# Patient Record
Sex: FEMALE | Race: WHITE
[De-identification: names, ages, dates, MRNs, and addresses within clinical notes are randomized per-mention and may not be internally consistent; named-entity substitution may affect disease eponyms.]

---

## 2019-06-03 ENCOUNTER — HOSPITAL ENCOUNTER (EMERGENCY)
Dept: HOSPITAL 63 - ER | Age: 49
Discharge: HOME | End: 2019-06-03
Payer: OTHER GOVERNMENT

## 2019-06-03 VITALS — DIASTOLIC BLOOD PRESSURE: 73 MMHG | SYSTOLIC BLOOD PRESSURE: 110 MMHG

## 2019-06-03 DIAGNOSIS — Z90.710: ICD-10-CM

## 2019-06-03 DIAGNOSIS — K57.32: Primary | ICD-10-CM

## 2019-06-03 DIAGNOSIS — F32.9: ICD-10-CM

## 2019-06-03 DIAGNOSIS — Z88.1: ICD-10-CM

## 2019-06-03 LAB
ALBUMIN SERPL-MCNC: 4.1 G/DL (ref 3.4–5)
ALBUMIN/GLOB SERPL: 1.3 {RATIO} (ref 1–1.7)
ALP SERPL-CCNC: 65 U/L (ref 46–116)
ALT SERPL-CCNC: 18 U/L (ref 14–59)
ANION GAP SERPL CALC-SCNC: 9 MMOL/L (ref 6–14)
APTT PPP: YELLOW S
AST SERPL-CCNC: 14 U/L (ref 15–37)
BACTERIA #/AREA URNS HPF: 0 /HPF
BASOPHILS # BLD AUTO: 0.1 X10^3/UL (ref 0–0.2)
BASOPHILS NFR BLD: 1 % (ref 0–3)
BILIRUB SERPL-MCNC: 0.3 MG/DL (ref 0.2–1)
BILIRUB UR QL STRIP: (no result)
BUN/CREAT SERPL: 30 (ref 6–20)
CA-I SERPL ISE-MCNC: 21 MG/DL (ref 7–20)
CALCIUM SERPL-MCNC: 8.9 MG/DL (ref 8.5–10.1)
CHLORIDE SERPL-SCNC: 102 MMOL/L (ref 98–107)
CO2 SERPL-SCNC: 30 MMOL/L (ref 21–32)
CREAT SERPL-MCNC: 0.7 MG/DL (ref 0.6–1)
EOSINOPHIL NFR BLD: 0.1 X10^3/UL (ref 0–0.7)
EOSINOPHIL NFR BLD: 2 % (ref 0–3)
ERYTHROCYTE [DISTWIDTH] IN BLOOD BY AUTOMATED COUNT: 13.9 % (ref 11.5–14.5)
FIBRINOGEN PPP-MCNC: CLEAR MG/DL
GFR SERPLBLD BASED ON 1.73 SQ M-ARVRAT: 88.9 ML/MIN
GLOBULIN SER-MCNC: 3.2 G/DL (ref 2.2–3.8)
GLUCOSE SERPL-MCNC: 84 MG/DL (ref 70–99)
GLUCOSE UR STRIP-MCNC: (no result) MG/DL
HCT VFR BLD CALC: 40.5 % (ref 36–47)
HGB BLD-MCNC: 13.3 G/DL (ref 12–15.5)
LIPASE: 99 U/L (ref 73–393)
LYMPHOCYTES # BLD: 1.8 X10^3/UL (ref 1–4.8)
LYMPHOCYTES NFR BLD AUTO: 23 % (ref 24–48)
MCH RBC QN AUTO: 28 PG (ref 25–35)
MCHC RBC AUTO-ENTMCNC: 33 G/DL (ref 31–37)
MCV RBC AUTO: 86 FL (ref 79–100)
MONO #: 0.6 X10^3/UL (ref 0–1.1)
MONOCYTES NFR BLD: 7 % (ref 0–9)
NEUT #: 5.2 X10^3UL (ref 1.8–7.7)
NEUTROPHILS NFR BLD AUTO: 67 % (ref 31–73)
NITRITE UR QL STRIP: (no result)
PLATELET # BLD AUTO: 297 X10^3/UL (ref 140–400)
POTASSIUM SERPL-SCNC: 4 MMOL/L (ref 3.5–5.1)
PROT SERPL-MCNC: 7.3 G/DL (ref 6.4–8.2)
RBC # BLD AUTO: 4.72 X10^6/UL (ref 3.5–5.4)
RBC #/AREA URNS HPF: 0 /HPF (ref 0–2)
SODIUM SERPL-SCNC: 141 MMOL/L (ref 136–145)
SP GR UR STRIP: 1.01
SQUAMOUS #/AREA URNS LPF: (no result) /LPF
UROBILINOGEN UR-MCNC: 0.2 MG/DL
WBC # BLD AUTO: 7.8 X10^3/UL (ref 4–11)
WBC #/AREA URNS HPF: (no result) /HPF (ref 0–4)

## 2019-06-03 PROCEDURE — 96374 THER/PROPH/DIAG INJ IV PUSH: CPT

## 2019-06-03 PROCEDURE — 74177 CT ABD & PELVIS W/CONTRAST: CPT

## 2019-06-03 PROCEDURE — 96376 TX/PRO/DX INJ SAME DRUG ADON: CPT

## 2019-06-03 PROCEDURE — 80053 COMPREHEN METABOLIC PANEL: CPT

## 2019-06-03 PROCEDURE — 85025 COMPLETE CBC W/AUTO DIFF WBC: CPT

## 2019-06-03 PROCEDURE — 99285 EMERGENCY DEPT VISIT HI MDM: CPT

## 2019-06-03 PROCEDURE — 85610 PROTHROMBIN TIME: CPT

## 2019-06-03 PROCEDURE — 81001 URINALYSIS AUTO W/SCOPE: CPT

## 2019-06-03 PROCEDURE — 96375 TX/PRO/DX INJ NEW DRUG ADDON: CPT

## 2019-06-03 PROCEDURE — 83690 ASSAY OF LIPASE: CPT

## 2019-06-03 PROCEDURE — 96372 THER/PROPH/DIAG INJ SC/IM: CPT

## 2019-06-03 PROCEDURE — 36415 COLL VENOUS BLD VENIPUNCTURE: CPT

## 2019-06-03 RX ADMIN — MORPHINE SULFATE PRN MG: 4 INJECTION, SOLUTION INTRAMUSCULAR; INTRAVENOUS at 17:20

## 2019-06-03 RX ADMIN — MORPHINE SULFATE PRN MG: 4 INJECTION, SOLUTION INTRAMUSCULAR; INTRAVENOUS at 16:05

## 2019-06-03 NOTE — RAD
CT scan of the abdomen and pelvis with contrast 6/3/2019

 

CLINICAL HISTORY: Severe lower abdominal pain.

 

TECHNIQUE: After the oral and intravenous administration of contrast, 

contiguous, 5 mm axial sections were obtained through the abdomen and 

pelvis. 75 cc of Omnipaque 300 were administered intravenously during this

examination.

 

One or more of the following individualized dose reduction techniques were

utilized for this study:

 

1. Automated exposure control.

2. Adjustment of the mA and/or kV according to patient size.

3. Use of iterative reconstruction technique.

 

 

FINDINGS: Comparison study is dated 2/3/2011.

 

Images through the lung bases are within normal limits.

 

A 1.4 cm rounded low-attenuation lesion is seen involving the left lobe of

the liver consistent with a hepatic cyst. The spleen, pancreas, adrenal 

glands and kidneys are within normal limits.

 

Atherosclerotic calcification of the abdominal aorta is seen. The 

abdominal aorta tapers normally. No free fluid or free air is seen within 

the abdomen. There is no evidence of bowel obstruction. The appendix is 

well-visualized and is within normal limits.

 

Multiple diverticula are seen involving the sigmoid colon. Focal wall 

thickening is seen involving the distal sigmoid colon. Increased density 

is seen within the adjacent fat. These findings are consistent with acute 

diverticulitis. No abnormal fluid collection is seen to suggest evidence 

of an abscess.

 

Images through the pelvis demonstrate the urinary bladder with urine. 

Calcifications are seen within the pelvis consistent with phleboliths. No 

free fluid is seen. Minimal S-shaped curvature of the thoracolumbar spine 

is noted. Degenerative changes are seen involving lower thoracic and 

throughout the lumbar spine and both hips.

 

IMPRESSION: Findings are seen consistent with sigmoid diverticulitis. No 

abscess is seen.

 

Electronically signed by: Amarjit Nielson MD (6/3/2019 5:22 PM) [facility]

## 2019-08-17 ENCOUNTER — HOSPITAL ENCOUNTER (EMERGENCY)
Dept: HOSPITAL 63 - ER | Age: 49
Discharge: HOME | End: 2019-08-17
Payer: OTHER GOVERNMENT

## 2019-08-17 VITALS — DIASTOLIC BLOOD PRESSURE: 83 MMHG | SYSTOLIC BLOOD PRESSURE: 121 MMHG

## 2019-08-17 DIAGNOSIS — R22.41: ICD-10-CM

## 2019-08-17 DIAGNOSIS — Z88.1: ICD-10-CM

## 2019-08-17 DIAGNOSIS — M79.604: Primary | ICD-10-CM

## 2019-08-17 LAB
ANION GAP SERPL CALC-SCNC: 7 MMOL/L (ref 6–14)
BASOPHILS # BLD AUTO: 0 X10^3/UL (ref 0–0.2)
BASOPHILS NFR BLD: 1 % (ref 0–3)
CA-I SERPL ISE-MCNC: 27 MG/DL (ref 7–20)
CALCIUM SERPL-MCNC: 8.8 MG/DL (ref 8.5–10.1)
CHLORIDE SERPL-SCNC: 104 MMOL/L (ref 98–107)
CO2 SERPL-SCNC: 29 MMOL/L (ref 21–32)
CREAT SERPL-MCNC: 0.8 MG/DL (ref 0.6–1)
EOSINOPHIL NFR BLD: 0.1 X10^3/UL (ref 0–0.7)
EOSINOPHIL NFR BLD: 2 % (ref 0–3)
ERYTHROCYTE [DISTWIDTH] IN BLOOD BY AUTOMATED COUNT: 14.3 % (ref 11.5–14.5)
GFR SERPLBLD BASED ON 1.73 SQ M-ARVRAT: 75.9 ML/MIN
GLUCOSE SERPL-MCNC: 99 MG/DL (ref 70–99)
HCT VFR BLD CALC: 38.5 % (ref 36–47)
HGB BLD-MCNC: 12.8 G/DL (ref 12–15.5)
LYMPHOCYTES # BLD: 2.3 X10^3/UL (ref 1–4.8)
LYMPHOCYTES NFR BLD AUTO: 30 % (ref 24–48)
MAGNESIUM SERPL-MCNC: 1.9 MG/DL (ref 1.8–2.4)
MCH RBC QN AUTO: 29 PG (ref 25–35)
MCHC RBC AUTO-ENTMCNC: 33 G/DL (ref 31–37)
MCV RBC AUTO: 87 FL (ref 79–100)
MONO #: 0.7 X10^3/UL (ref 0–1.1)
MONOCYTES NFR BLD: 9 % (ref 0–9)
NEUT #: 4.5 X10^3UL (ref 1.8–7.7)
NEUTROPHILS NFR BLD AUTO: 58 % (ref 31–73)
PLATELET # BLD AUTO: 291 X10^3/UL (ref 140–400)
POTASSIUM SERPL-SCNC: 4 MMOL/L (ref 3.5–5.1)
RBC # BLD AUTO: 4.44 X10^6/UL (ref 3.5–5.4)
SODIUM SERPL-SCNC: 140 MMOL/L (ref 136–145)
WBC # BLD AUTO: 7.7 X10^3/UL (ref 4–11)

## 2019-08-17 PROCEDURE — 80048 BASIC METABOLIC PNL TOTAL CA: CPT

## 2019-08-17 PROCEDURE — 85025 COMPLETE CBC W/AUTO DIFF WBC: CPT

## 2019-08-17 PROCEDURE — 93971 EXTREMITY STUDY: CPT

## 2019-08-17 PROCEDURE — 85730 THROMBOPLASTIN TIME PARTIAL: CPT

## 2019-08-17 PROCEDURE — 85610 PROTHROMBIN TIME: CPT

## 2019-08-17 PROCEDURE — 36415 COLL VENOUS BLD VENIPUNCTURE: CPT

## 2019-08-17 PROCEDURE — 83735 ASSAY OF MAGNESIUM: CPT

## 2019-08-17 PROCEDURE — 99285 EMERGENCY DEPT VISIT HI MDM: CPT

## 2019-08-17 NOTE — PHYS DOC
Past History


Past Medical History:  Cancer, Depression, Other


 (SUZANNE TORRES DO)


Past Surgical History:  Hysterectomy, Oophorectomy, Other


 (SUZANNE TORRES DO)


Smoking:  Non-smoker


Alcohol Use:  Occasionally


Drug Use:  None


 (SUZANNE TORRES DO)





Adult General


Chief Complaint


Chief Complaint:  LOWER EXT PAIN





HPI


HPI





Patient is a 49-year-old female presents complaining of right lower extremity 

pain, swelling, and cramping that started last night. Patient has a history of 

uterine cancer. She has not had any previous history of DVT. No recent 

surgeries. Feels more like a cramp in nature and moderate discomfort. Denies any

trauma. Denies problems walking other than feeling like her leg is twisting in 

words. She is able to ambulate. No numbness or tingling. Increased pain with 

movement.[]


 (SUZANNE TORRES DO)





Review of Systems


Review of Systems





Constitutional: Denies fever or chills []


Eyes: Denies change in visual acuity, redness, or eye pain []


HENT: Denies nasal congestion or sore throat []


Respiratory: Denies cough or shortness of breath []


Cardiovascular: No chest pain or palpitations[]


GI: Denies abdominal pain, nausea, vomiting, bloody stools or diarrhea []


: Denies dysuria or hematuria []


Musculoskeletal: Denies back pain, see history of present illness[]


Integument: Denies rash or skin lesions []


Neurologic: Denies headache, focal weakness or sensory changes []


Endocrine: Denies polyuria or polydipsia []





All other systems were reviewed and found to be within normal limits, except as 

documented in this note.


 (SUZANNE TORRES DO)





Allergies


Allergies





Allergies








Coded Allergies Type Severity Reaction Last Updated Verified


 


  cephalexin Allergy Unknown  6/3/19 Yes








 (SUZANNE TORRES DO)





Physical Exam


Physical Exam





Constitutional: Well developed, well nourished, no acute distress, non-toxic 

appearance. []


HENT: Normocephalic, atraumatic, bilateral external ears normal, oropharynx 

moist, no oral exudates, nose normal. []


Eyes: PERRLA, EOMI, conjunctiva normal, no discharge. [] 


Neck: Normal range of motion, no tenderness, supple, no stridor. [] 


Cardiovascular:Heart rate regular rhythm, no murmur []


Lungs & Thorax:  Bilateral breath sounds clear to auscultation []


Abdomen: Bowel sounds normal, soft, no tenderness, no masses, no pulsatile m

asses. [] 


Skin: Warm, dry, no erythema, no rash. [] 


Back: No tenderness, no CVA tenderness. [] 


Extremities: Right lower extremity shows some diffuse swelling. She is distally 

neurovascularly intact. No pain with axial compression of any of the long bones.

 Mild discomfort with Homans sign. Full active range of motion in the knee, hip,

 ankle, and toes. Measurement of the right calf is 39 cm circumference. 

Measurement of the left calf is 39.5 cm circumference.


The other 3 external show: No tenderness, no cyanosis, no clubbing, ROM intact, 

no edema. [] 


Neurologic: Alert and oriented X 3, normal motor function, normal sensory 

function, no focal deficits noted. []


Psychologic: Affect normal, judgement normal, mood normal. []


 (SUZANNE TORRES DO)





EKG


EKG


[]


 (SUZANNE TORRES DO)





Radiology/Procedures


Radiology/Procedures


[]


 (SUZANNE TORRES DO)


Impressions:


CLINICAL HISTORY: right lower extremity pain and bruising


 


COMPARISON: None available.


 


TECHNIQUE:


Ultrasound evaluation of the right leg was performed from the groin to the


upper calf with grey scale, spectral and color doppler evaluation.


 


FINDINGS:


The right common femoral vein, and femoral vein, including the 


saphenous-femoral junction are normal in appearance. Color and spectral 


Doppler evaluation demonstrates normal spontaneous flow, augmentation and 


phasicity.


 


The right popliteal vein and visualized calf veins also demonstrate normal


compressibility and flow.


 


IMPRESSION: 


No evidence for right lower extremity DVT


 


Electronically signed by: Lauri Fuentes MD (8/17/2019 7:02 PM) [facility]














DICTATED AND SIGNED BY:     LAURI FUENTES MD


DATE:     08/17/19 1902





CC: FRANSICO TENA DO; ASHLEE BOWEN; SUZANNE TORRES DO ~


 (FRANSICO TENA DO)


Course & Med Decision Making


Course & Med Decision Making


Pertinent Labs and Imaging studies reviewed. (See chart for details)





ED course: Patient arrived, was placed in bed, and tolerated exam well. At the 

time of this dictation laboratory and imaging findings are still pending. 

Patient care was endorsed to the nighttime physician at 1800.[]


 (SUZANNE TORRES DO)


Course & Med Decision Making


The patient's labs are unremarkable. The ultrasound was negative for DVT. These 

results are reassuring to the patient. Based on the bruising on her leg, I'm 

assuming this was a superficial vessel that ruptured or leaking for some reason.

 Not sure if this would be the entire cause for the muscle spasm she had, but 

had no other evidence for why she had muscle spasm or the bruising. She will 

follow up with her primary care physician as needed. She is stable for discharge

 at this time.


 (FRANSICO TENA DO)


Dragon Disclaimer


Dragon Disclaimer


This electronic medical record was generated, in whole or in part, using a voice

 recognition dictation system.


 (SUZANNE TORRES DO)





Departure


Departure:


Impression:  


   Primary Impression:  


   Lower extremity pain


Disposition:  01 HOME, SELF-CARE


Condition:  STABLE


Referrals:  


ASHLEE BOWEN (PCP)


Patient Instructions:  Leg Cramps





Problem Qualifiers








   Primary Impression:  


   Lower extremity pain


   Laterality:  right  Qualified Codes:  M79.604 - Pain in right leg








SUZANNE TORRES DO          Aug 17, 2019 17:45


FRANSICO TENA DO                 Aug 17, 2019 19:09

## 2019-08-17 NOTE — RAD
CLINICAL HISTORY: right lower extremity pain and bruising

 

COMPARISON: None available.

 

TECHNIQUE:

Ultrasound evaluation of the right leg was performed from the groin to the

upper calf with grey scale, spectral and color doppler evaluation.

 

FINDINGS:

The right common femoral vein, and femoral vein, including the 

saphenous-femoral junction are normal in appearance. Color and spectral 

Doppler evaluation demonstrates normal spontaneous flow, augmentation and 

phasicity.

 

The right popliteal vein and visualized calf veins also demonstrate normal

compressibility and flow.

 

IMPRESSION: 

No evidence for right lower extremity DVT

 

Electronically signed by: Lauri Roland MD (8/17/2019 7:02 PM) [facility]

## 2019-09-23 NOTE — PHYS DOC
Past History


Past Medical History:  Cancer, Depression, Other


Past Surgical History:  Hysterectomy, Oophorectomy, Other


Smoking:  Non-smoker


Alcohol Use:  Occasionally


Drug Use:  None





Adult General


Chief Complaint


Chief Complaint:  ABDOMINAL PAIN





HPI


HPI





Patient is a 48-year-old female presents complaining of severe lower abdominal 

pain. It started this morning, it has been waxing and waning. Movement seems to 

make it a little bit worse. Severe pain on the car ride here. Some nausea 

without vomiting. No diarrhea. No vaginal bleeding. No dysuria. She does have a 

history of ovarian, cervical, and uterine cancer and is post hysterectomy. She 

still has her appendix. She has never had pain like this before. Pain does 

radiate into her back. She reports it feels like labor pains. She took an 

ibuprofen 800 mg tablet this morning without any significant improvement in the 

discomfort.[]





Review of Systems


Review of Systems





Constitutional: Denies fever or chills []


Eyes: Denies change in visual acuity, redness, or eye pain []


HENT: Denies nasal congestion or sore throat []


Respiratory: Denies cough or shortness of breath []


Cardiovascular: No additional information not addressed in HPI []


GI: Denies abdominal pain, nausea, vomiting, bloody stools or diarrhea []


: Denies dysuria or hematuria []


Musculoskeletal: Denies back pain or joint pain []


Integument: Denies rash or skin lesions []


Neurologic: Denies headache, focal weakness or sensory changes []


Endocrine: Denies polyuria or polydipsia []





All other systems were reviewed and found to be within normal limits, except as 

documented in this note.





Allergies


Allergies





Allergies








Coded Allergies Type Severity Reaction Last Updated Verified


 


  cephalexin Allergy Unknown  6/3/19 Yes











Physical Exam


Physical Exam





Constitutional: Well developed, well nourished, mild to moderate discomfort, 

non-toxic appearance. []


HENT: Normocephalic, atraumatic, bilateral external ears normal, oropharynx 

moist, no oral exudates, nose normal. []


Eyes: PERRLA, EOMI, conjunctiva normal, no discharge. [] 


Neck: Normal range of motion, no tenderness, supple, no stridor. [] 


Cardiovascular:Heart rate regular rhythm, no murmur []


Lungs & Thorax:  Bilateral breath sounds clear to auscultation []


Abdomen: Bowel sounds normal, soft, tenderness in the lower abdomen, rebound is 

present, no significant guarding, no rigidity, able to sit up and lay back 

without any significant difficulty, no masses, no pulsatile masses. [] 


Skin: Warm, dry, no erythema, no rash. [] 


Back: No tenderness, no CVA tenderness. [] 


Extremities: No tenderness, no cyanosis, no clubbing, ROM intact, no edema. [] 


Neurologic: Alert and oriented X 3, normal motor function, normal sensory 

function, no focal deficits noted. []


Psychologic: Affect normal, judgement normal, mood normal. []





Current Patient Data


Vital Signs





                                   Vital Signs








  Date Time  Temp Pulse Resp B/P (MAP) Pulse Ox O2 Delivery O2 Flow Rate FiO2


 


6/3/19 15:15 98.6 90 18  99 Room Air  











EKG


EKG


[]





Radiology/Procedures


Radiology/Procedures


PROCEDURE: CT ABD PELV W/ORAL&IV CONTRAST





CT scan of the abdomen and pelvis with contrast 6/3/2019


 


CLINICAL HISTORY: Severe lower abdominal pain.


 


TECHNIQUE: After the oral and intravenous administration of contrast, 


contiguous, 5 mm axial sections were obtained through the abdomen and 


pelvis. 75 cc of Omnipaque 300 were administered intravenously during this


examination.


 


One or more of the following individualized dose reduction techniques were


utilized for this study:


 


1. Automated exposure control.


2. Adjustment of the mA and/or kV according to patient size.


3. Use of iterative reconstruction technique.


 


 


FINDINGS: Comparison study is dated 2/3/2011.


 


Images through the lung bases are within normal limits.


 


A 1.4 cm rounded low-attenuation lesion is seen involving the left lobe of


the liver consistent with a hepatic cyst. The spleen, pancreas, adrenal 


glands and kidneys are within normal limits.


 


Atherosclerotic calcification of the abdominal aorta is seen. The 


abdominal aorta tapers normally. No free fluid or free air is seen within 


the abdomen. There is no evidence of bowel obstruction. The appendix is 


well-visualized and is within normal limits.


 


Multiple diverticula are seen involving the sigmoid colon. Focal wall 


thickening is seen involving the distal sigmoid colon. Increased density 


is seen within the adjacent fat. These findings are consistent with acute 


diverticulitis. No abnormal fluid collection is seen to suggest evidence 


of an abscess.


 


Images through the pelvis demonstrate the urinary bladder with urine. 


Calcifications are seen within the pelvis consistent with phleboliths. No 


free fluid is seen. Minimal S-shaped curvature of the thoracolumbar spine 


is noted. Degenerative changes are seen involving lower thoracic and 


throughout the lumbar spine and both hips.


 


IMPRESSION: Findings are seen consistent with sigmoid diverticulitis. No 


abscess is seen.[]





Course & Med Decision Making


Course & Med Decision Making


Pertinent Labs and Imaging studies reviewed. (See chart for details)





ED course: Patient arrived, was placed in bed, and tolerated exam well. She was 

able to tolerate oral contrast, and was transported to and from CT with any 

complications. After the return of the laboratory and imaging findings, these 

were discussed with the patient and her partner, both of whom voiced 

understanding. She was given additional doses of pain medicine while in the 

emergency department as well as antispasmodics. Patient relates that her mother 

also has a history of diverticulitis. She further relates that she ate popcorn a

 day or 2 prior to the onset of this discomfort. She was discharged in improved 

condition with all questions answered.





Medical decision making: Patient appears to have diverticulitis without any 

evidence of an obstruction, no perforation, no abscess, no identified need for 

surgical intervention. No evidence of kidney stone, renal failure, nor 

appendicitis.[]





Dragon Disclaimer


Dragon Disclaimer


This electronic medical record was generated, in whole or in part, using a voice

 recognition dictation system.





Departure


Departure:


Impression:  


   Primary Impression:  


   Diverticulitis


Disposition:  01 HOME, SELF-CARE


Condition:  IMPROVED


Referrals:  


DIONI ALCANTARA PA-C (PCP)


Follow-up in 2 days


Patient Instructions:  Diverticulitis





Additional Instructions:  


Drink plenty fluids. Eat a high-fiber diet. Avoid nuts and seeds. Take your 

medication as prescribed. Follow-up with your regular doctor in 2 days. Return 

to the ER if worsening pain, unable to tolerate liquids, or any other concerns.


Scripts


Polyethylene Glycol 3350 (MIRALAX) 119 Gm Powder


17 GM PO DAILY for constipation, #527 GM


   Prov: SUZANNE TORRES DO         6/3/19 


Metoclopramide Hcl (REGLAN) 10 Mg Tablet


10 MG PO QID for nausea and vomiting, #30 TAB


   Prov: SUZANNE TORRES DO         6/3/19 


Metronidazole (FLAGYL) 500 Mg Tablet


500 MG PO QID for diverticulitis for 10 Days, #40 TAB


   Prov: SUZANNE TORRES DO         6/3/19 


Hydrocodone Bit/Acetaminophen (NORCO 5-325 TABLET) 1 Each Tablet


1-2 TAB PO Q4-6HRS for severe pain, #20 TAB


   Prov: SUZANNE TORRES DO         6/3/19 


Meclizine Hcl (MECLIZINE HCL) 25 Mg Tablet


1 TAB PO PRN TID for vertigo, #30 TAB


   Prov: SUZANNE TORRES DO         6/3/19 


Hyoscyamine Sulfate (LEVSIN) 0.125 Mg Tablet


0.125 MG PO QID for abdominal pain/cramping, #30 TAB


   Prov: SUZANNE TORRES DO         6/3/19 


Sulfamethoxazole/Trimethoprim (BACTRIM DS TABLET) 1 Each Tablet


1 TAB PO BID for diverticulitis, #20 TAB


   Prov: SUZANNE TORRES DO         6/3/19











SUZANNE TORRES DO           Tino 3, 2019 15:56
skin tears

## 2020-01-07 ENCOUNTER — HOSPITAL ENCOUNTER (EMERGENCY)
Dept: HOSPITAL 63 - ER | Age: 50
Discharge: ANOTHER HOSPITAL | End: 2020-01-07
Payer: OTHER GOVERNMENT

## 2020-01-07 VITALS — SYSTOLIC BLOOD PRESSURE: 96 MMHG | DIASTOLIC BLOOD PRESSURE: 55 MMHG

## 2020-01-07 DIAGNOSIS — F32.9: ICD-10-CM

## 2020-01-07 DIAGNOSIS — Z88.1: ICD-10-CM

## 2020-01-07 DIAGNOSIS — K63.1: Primary | ICD-10-CM

## 2020-01-07 LAB
ALBUMIN SERPL-MCNC: 3.7 G/DL (ref 3.4–5)
ALBUMIN/GLOB SERPL: 1.2 {RATIO} (ref 1–1.7)
ALP SERPL-CCNC: 75 U/L (ref 46–116)
ALT SERPL-CCNC: 15 U/L (ref 14–59)
ANION GAP SERPL CALC-SCNC: 9 MMOL/L (ref 6–14)
AST SERPL-CCNC: 12 U/L (ref 15–37)
BASOPHILS # BLD AUTO: 0.1 X10^3/UL (ref 0–0.2)
BASOPHILS NFR BLD: 1 % (ref 0–3)
BILIRUB SERPL-MCNC: 0.3 MG/DL (ref 0.2–1)
BUN/CREAT SERPL: 23 (ref 6–20)
CA-I SERPL ISE-MCNC: 16 MG/DL (ref 7–20)
CALCIUM SERPL-MCNC: 9.2 MG/DL (ref 8.5–10.1)
CHLORIDE SERPL-SCNC: 103 MMOL/L (ref 98–107)
CO2 SERPL-SCNC: 28 MMOL/L (ref 21–32)
CREAT SERPL-MCNC: 0.7 MG/DL (ref 0.6–1)
EOSINOPHIL NFR BLD: 0.1 X10^3/UL (ref 0–0.7)
EOSINOPHIL NFR BLD: 1 % (ref 0–3)
ERYTHROCYTE [DISTWIDTH] IN BLOOD BY AUTOMATED COUNT: 13.6 % (ref 11.5–14.5)
GFR SERPLBLD BASED ON 1.73 SQ M-ARVRAT: 88.6 ML/MIN
GLOBULIN SER-MCNC: 3 G/DL (ref 2.2–3.8)
GLUCOSE SERPL-MCNC: 170 MG/DL (ref 70–99)
HCT VFR BLD CALC: 40.5 % (ref 36–47)
HGB BLD-MCNC: 12.9 G/DL (ref 12–15.5)
LYMPHOCYTES # BLD: 1.4 X10^3/UL (ref 1–4.8)
LYMPHOCYTES NFR BLD AUTO: 23 % (ref 24–48)
MCH RBC QN AUTO: 27 PG (ref 25–35)
MCHC RBC AUTO-ENTMCNC: 32 G/DL (ref 31–37)
MCV RBC AUTO: 85 FL (ref 79–100)
MONO #: 0.4 X10^3/UL (ref 0–1.1)
MONOCYTES NFR BLD: 7 % (ref 0–9)
NEUT #: 4.3 X10^3UL (ref 1.8–7.7)
NEUTROPHILS NFR BLD AUTO: 68 % (ref 31–73)
PLATELET # BLD AUTO: 265 X10^3/UL (ref 140–400)
POTASSIUM SERPL-SCNC: 3.2 MMOL/L (ref 3.5–5.1)
PROT SERPL-MCNC: 6.7 G/DL (ref 6.4–8.2)
RBC # BLD AUTO: 4.78 X10^6/UL (ref 3.5–5.4)
SODIUM SERPL-SCNC: 140 MMOL/L (ref 136–145)
WBC # BLD AUTO: 6.4 X10^3/UL (ref 4–11)

## 2020-01-07 PROCEDURE — 93005 ELECTROCARDIOGRAM TRACING: CPT

## 2020-01-07 PROCEDURE — 96375 TX/PRO/DX INJ NEW DRUG ADDON: CPT

## 2020-01-07 PROCEDURE — 83605 ASSAY OF LACTIC ACID: CPT

## 2020-01-07 PROCEDURE — 99285 EMERGENCY DEPT VISIT HI MDM: CPT

## 2020-01-07 PROCEDURE — 96366 THER/PROPH/DIAG IV INF ADDON: CPT

## 2020-01-07 PROCEDURE — 74177 CT ABD & PELVIS W/CONTRAST: CPT

## 2020-01-07 PROCEDURE — 84484 ASSAY OF TROPONIN QUANT: CPT

## 2020-01-07 PROCEDURE — 80053 COMPREHEN METABOLIC PANEL: CPT

## 2020-01-07 PROCEDURE — 96365 THER/PROPH/DIAG IV INF INIT: CPT

## 2020-01-07 PROCEDURE — 85025 COMPLETE CBC W/AUTO DIFF WBC: CPT

## 2020-01-07 PROCEDURE — 36415 COLL VENOUS BLD VENIPUNCTURE: CPT

## 2020-01-07 NOTE — RAD
Study: CT abdomen/pelvis with intravenous contrast

 

Indication: Lower abdominal pain status post ileostomy reversal.

 

Comparison: 6/3/2019

 

Technique: Helical CT imaging performed of the abdomen and pelvis after 

the intravenous administration of 75 cc Omnipaque 300 contrast. Sagittal 

and coronal reformats were obtained. 

 

One or more of the following individualized dose reduction techniques were

utilized for this examination:  

 

1. Automated exposure control

2. Adjustment of the mA and/or kV according to patient size

3. Use of iterative reconstruction technique.

 

Findings:

 

Unremarkable lower lungs and visualized heart.

 

Unchanged low-attenuation focus within the left hepatic lobe. Unremarkable

gallbladder. No abnormality seen to involve the pancreas, spleen or 

adrenal glands. No newly seen abnormality of the kidneys. No 

hydroureteronephrosis. Unremarkable urinary bladder.

 

The uterus is absent. No adnexal mass. Small amount of air within the 

vaginal cuff, image 68 series 2, felt unlikely related to fistulization 

with the adjacent colon though this is not entirely excluded.

 

Distal colonic anastomosis. Small bowel anastomosis at the pelvis. 

Surgical changes along the stomach. No findings of small bowel 

obstruction. The small bowel is thick-walled in the region of the 

anastomosis with surrounding inflammatory changes such as seen on image 61

series 2. Small foci of air extend along suture material such as seen on 

images 59 and 60 series 2. No fluid collection seen at this location. 

Minimal wall thickening of the sigmoid colon adjacent to the mildly 

thick-walled small bowel. A few diverticuli are noted without findings of 

diverticulitis. No colonic obstruction, pneumatosis or perforation.

 

Mild scattered vascular calcifications. A few subcentimeter mesenteric 

lymph nodes do not meet pathologic criteria based on size. No free pelvic 

fluid. Right lower quadrant ventral subcutaneous fat stranding likely 

scarring/mild residual inflammation at the site of prior ileostomy.

 

No acute osseous abnormality. Scattered degenerative changes.

 

Impression:

1.  Sequela of ileostomy reversal. At the small bowel anastomosis within 

the pelvis, there is localized inflammatory changes, segmental small bowel

wall thickening and small foci of air extending along suture material. No 

free fluid or fluid collection. Given reported surgery on December 10, the

small foci of air with surrounding inflammatory changes at the anastomosis

could potentially represent a contained perforation. It is possible 

however that the air is within a neck of small bowel that was partially 

excluded during the anastomosis. The adjacent colon exhibits scattered 

diverticuli but without findings to suggest diverticulitis. No small bowel

obstruction.

2.  Surgical changes of the stomach without complicating features.

3.  Additional chronic findings as detailed in the body of the report.

 

Electronically signed by: KAL SHER MD (1/7/2020 6:51 PM) [facility]

## 2020-01-07 NOTE — PHYS DOC
Past History


Past Medical History:  Cancer, Depression


Past Surgical History:  Cancer Surgery


Smoking:  Non-smoker


Alcohol Use:  None


Drug Use:  None





Adult General


Chief Complaint


Chief Complaint:  RAPID HEART RATE





HPI


HPI


49-year-old female presents with rapid heart beat and abdominal pain. She 

recently had an ileostomy and reversal. She went to see her primary doctor 

because she was having increasing lower abdominal pain. There were concerned for

potential infection. She did not get CT yet due to preapproval requirement. She 

presents emergency room today because she started to have extremely high heart 

rate 2 hours ago. She was just sitting on the couch when this started. She has 

no history of previous high heart rates. No history of arrhythmia. She denies 

fever or chills. The patient did have a significant infection the past with no 

fever.





Review of Systems


Review of Systems





Constitutional: Denies fever or chills []


Eyes: Denies change in visual acuity, redness, or eye pain []


HENT: Denies nasal congestion or sore throat []


Respiratory: Denies cough or shortness of breath []


Cardiovascular: No additional information not addressed in HPI []


GI: Lower abdominal pain. Denies nausea, vomiting, bloody stools or diarrhea []


: Denies dysuria or hematuria []


Musculoskeletal: Denies back pain or joint pain []


Integument: Denies rash or skin lesions []


Neurologic: Denies headache, focal weakness or sensory changes []


Endocrine: Denies polyuria or polydipsia []





All other systems were reviewed and found to be within normal limits, except as 

documented in this note.





Current Medications


Current Medications





Current Medications








 Medications


  (Trade)  Dose


 Ordered  Sig/[facility]  Start Time


 Stop Time Status Last Admin


Dose Admin


 


 Sodium Chloride  1,000 ml @ 


 1,000 mls/hr  1X  ONCE  1/7/20 17:00


 1/7/20 17:59   














Allergies


Allergies





Allergies








Coded Allergies Type Severity Reaction Last Updated Verified


 


  cephalexin Allergy Unknown  6/3/19 Yes











Physical Exam


Physical Exam





Constitutional: Well developed, well nourished, mild acute distress, non-toxic 

appearance. []


HENT: Normocephalic, atraumatic, bilateral external ears normal, oropharynx 

moist, no oral exudates, nose normal. []


Eyes: PERRLA, EOMI, conjunctiva normal, no discharge. [] 


Neck: Normal range of motion, no tenderness, supple, no stridor. [] 


Cardiovascular:Heart rate regular rhythm, no murmur []


Lungs & Thorax:  Bilateral breath sounds clear to auscultation []


Abdomen: Bowel sounds normal, soft, lower quadrant tenderness, no masses, no 

pulsatile masses. [] 


Skin: Warm, dry, no erythema, no rash. [] 


Back: No tenderness, no CVA tenderness. [] 


Extremities: No tenderness, no cyanosis, no clubbing, ROM intact, no edema. [] 


Neurologic: Alert and oriented X 3, normal motor function, normal sensory 

function, no focal deficits noted. []


Psychologic: Affect normal, judgement normal, mood normal. []





EKG


EKG


Sinus tachycardia, rate 124, normal axis, no ST elevations or depressions.[]





Radiology/Procedures


Radiology/Procedures


[]


Impressions:


Study: CT abdomen/pelvis with intravenous contrast


 


Indication: Lower abdominal pain status post ileostomy reversal.


 


Comparison: 6/3/2019


 


Technique: Helical CT imaging performed of the abdomen and pelvis after 


the intravenous administration of 75 cc Omnipaque 300 contrast. Sagittal 


and coronal reformats were obtained. 


 


One or more of the following individualized dose reduction techniques were


utilized for this examination:  


 


1. Automated exposure control


2. Adjustment of the mA and/or kV according to patient size


3. Use of iterative reconstruction technique.


 


Findings:


 


Unremarkable lower lungs and visualized heart.


 


Unchanged low-attenuation focus within the left hepatic lobe. Unremarkable


gallbladder. No abnormality seen to involve the pancreas, spleen or 


adrenal glands. No newly seen abnormality of the kidneys. No 


hydroureteronephrosis. Unremarkable urinary bladder.


 


The uterus is absent. No adnexal mass. Small amount of air within the 


vaginal cuff, image 68 series 2, felt unlikely related to fistulization 


with the adjacent colon though this is not entirely excluded.


 


Distal colonic anastomosis. Small bowel anastomosis at the pelvis. 


Surgical changes along the stomach. No findings of small bowel 


obstruction. The small bowel is thick-walled in the region of the 


anastomosis with surrounding inflammatory changes such as seen on image 61


series 2. Small foci of air extend along suture material such as seen on 


images 59 and 60 series 2. No fluid collection seen at this location. 


Minimal wall thickening of the sigmoid colon adjacent to the mildly 


thick-walled small bowel. A few diverticuli are noted without findings of 


diverticulitis. No colonic obstruction, pneumatosis or perforation.


 


Mild scattered vascular calcifications. A few subcentimeter mesenteric 


lymph nodes do not meet pathologic criteria based on size. No free pelvic 


fluid. Right lower quadrant ventral subcutaneous fat stranding likely 


scarring/mild residual inflammation at the site of prior ileostomy.


 


No acute osseous abnormality. Scattered degenerative changes.


 


Impression:


1.  Sequela of ileostomy reversal. At the small bowel anastomosis within 


the pelvis, there is localized inflammatory changes, segmental small bowel


wall thickening and small foci of air extending along suture material. No 


free fluid or fluid collection. Given reported surgery on December 10, the


small foci of air with surrounding inflammatory changes at the anastomosis


could potentially represent a contained perforation. It is possible 


however that the air is within a neck of small bowel that was partially 


excluded during the anastomosis. The adjacent colon exhibits scattered 


diverticuli but without findings to suggest diverticulitis. No small bowel


obstruction.


2.  Surgical changes of the stomach without complicating features.


3.  Additional chronic findings as detailed in the body of the report.


 


Electronically signed by: KAL SHER MD (1/7/2020 6:51 PM) [facility]














DICTATED AND SIGNED BY:     KAL SHER MD


DATE:     01/07/20 1851





CC: FRANSICO TENA DO; ASHLEE BOWEN ~





Course & Med Decision Making


Course & Med Decision Making


Pertinent Labs and Imaging studies reviewed. (See chart for details)


The patient's workup is pending. I'm signing the patient out to Dr. Allred at 

1830.


[]





Dragon Disclaimer


Dragon Disclaimer


This electronic medical record was generated, in whole or in part, using a voice

 recognition dictation system.





Departure


Departure:


Disposition:  01 HOME/RESIDENCE PRIOR TO ADM


Condition:  STABLE


Referrals:  


ASHLEE BOWEN (PCP)











FRANSICO TENA DO                  Jan 7, 2020 17:37

## 2020-01-08 NOTE — EKG
[facility] [address], [place] [address]

Test Date:    2020               Test Time:    17:02:32

Pat Name:     SARA FREED           Department:   

Patient ID:   [id number]           Room:          

Gender:       F                        Technician:   

:          [id number]               Requested By: FRANSICO TENA

Order Number: [id number]            Reading MD:     

                                 Measurements

Intervals                              Axis          

Rate:         124                      P:            11

TX:           118                      QRS:          0

QRSD:         84                       T:            26

QT:           316                                    

QTc:          458                                    

                           Interpretive Statements

SINUS TACHYCARDIA

LEFTWARD AXIS

R-S TRANSITION ZONE IN V LEADS DISPLACED TO THE LEFT

LOW LIMB LEAD VOLTAGE

NO SPECIFIC ECG ABNORMALITIES

RI6.01

No previous ECG available for comparison

## 2021-06-01 ENCOUNTER — HOSPITAL ENCOUNTER (EMERGENCY)
Dept: HOSPITAL 63 - ER | Age: 51
Discharge: ANOTHER HOSPITAL | End: 2021-06-01
Payer: OTHER GOVERNMENT

## 2021-06-01 VITALS — BODY MASS INDEX: 29.42 KG/M2 | WEIGHT: 176.59 LBS | HEIGHT: 65 IN

## 2021-06-01 VITALS — SYSTOLIC BLOOD PRESSURE: 138 MMHG | DIASTOLIC BLOOD PRESSURE: 75 MMHG

## 2021-06-01 DIAGNOSIS — Z93.2: ICD-10-CM

## 2021-06-01 DIAGNOSIS — R10.9: Primary | ICD-10-CM

## 2021-06-01 DIAGNOSIS — Z90.49: ICD-10-CM

## 2021-06-01 DIAGNOSIS — Z88.1: ICD-10-CM

## 2021-06-01 DIAGNOSIS — K43.9: ICD-10-CM

## 2021-06-01 LAB
ALBUMIN SERPL-MCNC: 4 G/DL (ref 3.4–5)
ALBUMIN/GLOB SERPL: 1.3 {RATIO} (ref 1–1.7)
ALP SERPL-CCNC: 94 U/L (ref 46–116)
ALT SERPL-CCNC: 16 U/L (ref 14–59)
ANION GAP SERPL CALC-SCNC: 8 MMOL/L (ref 6–14)
APTT PPP: YELLOW S
AST SERPL-CCNC: 19 U/L (ref 15–37)
BACTERIA #/AREA URNS HPF: 0 /HPF
BASOPHILS # BLD AUTO: 0.1 X10^3/UL (ref 0–0.2)
BASOPHILS NFR BLD: 1 % (ref 0–3)
BILIRUB SERPL-MCNC: 0.6 MG/DL (ref 0.2–1)
BILIRUB UR QL STRIP: (no result)
BUN/CREAT SERPL: 28 (ref 6–20)
CA-I SERPL ISE-MCNC: 17 MG/DL (ref 7–20)
CALCIUM SERPL-MCNC: 9.2 MG/DL (ref 8.5–10.1)
CHLORIDE SERPL-SCNC: 104 MMOL/L (ref 98–107)
CO2 SERPL-SCNC: 29 MMOL/L (ref 21–32)
CREAT SERPL-MCNC: 0.6 MG/DL (ref 0.6–1)
EOSINOPHIL NFR BLD: 0.1 X10^3/UL (ref 0–0.7)
EOSINOPHIL NFR BLD: 1 % (ref 0–3)
ERYTHROCYTE [DISTWIDTH] IN BLOOD BY AUTOMATED COUNT: 14.1 % (ref 11.5–14.5)
FIBRINOGEN PPP-MCNC: CLEAR MG/DL
GFR SERPLBLD BASED ON 1.73 SQ M-ARVRAT: 105.4 ML/MIN
GLOBULIN SER-MCNC: 3.2 G/DL (ref 2.2–3.8)
GLUCOSE SERPL-MCNC: 101 MG/DL (ref 70–99)
GLUCOSE UR STRIP-MCNC: (no result) MG/DL
HCT VFR BLD CALC: 39.2 % (ref 36–47)
HGB BLD-MCNC: 13.1 G/DL (ref 12–15.5)
LIPASE: 61 U/L (ref 73–393)
LYMPHOCYTES # BLD: 1.9 X10^3/UL (ref 1–4.8)
LYMPHOCYTES NFR BLD AUTO: 21 % (ref 24–48)
MCH RBC QN AUTO: 30 PG (ref 25–35)
MCHC RBC AUTO-ENTMCNC: 33 G/DL (ref 31–37)
MCV RBC AUTO: 88 FL (ref 79–100)
MONO #: 0.7 X10^3/UL (ref 0–1.1)
MONOCYTES NFR BLD: 8 % (ref 0–9)
NEUT #: 6.2 X10^3UL (ref 1.8–7.7)
NEUTROPHILS NFR BLD AUTO: 69 % (ref 31–73)
NITRITE UR QL STRIP: (no result)
PLATELET # BLD AUTO: 259 X10^3/UL (ref 140–400)
POTASSIUM SERPL-SCNC: 5.2 MMOL/L (ref 3.5–5.1)
PROT SERPL-MCNC: 7.2 G/DL (ref 6.4–8.2)
RBC # BLD AUTO: 4.43 X10^6/UL (ref 3.5–5.4)
RBC #/AREA URNS HPF: 0 /HPF (ref 0–2)
SODIUM SERPL-SCNC: 141 MMOL/L (ref 136–145)
SP GR UR STRIP: <=1.005
SQUAMOUS #/AREA URNS LPF: (no result) /LPF
UROBILINOGEN UR-MCNC: 0.2 MG/DL
WBC # BLD AUTO: 8.9 X10^3/UL (ref 4–11)
WBC #/AREA URNS HPF: 0 /HPF (ref 0–4)

## 2021-06-01 PROCEDURE — 76705 ECHO EXAM OF ABDOMEN: CPT

## 2021-06-01 PROCEDURE — 96374 THER/PROPH/DIAG INJ IV PUSH: CPT

## 2021-06-01 PROCEDURE — 36415 COLL VENOUS BLD VENIPUNCTURE: CPT

## 2021-06-01 PROCEDURE — 99285 EMERGENCY DEPT VISIT HI MDM: CPT

## 2021-06-01 PROCEDURE — 74177 CT ABD & PELVIS W/CONTRAST: CPT

## 2021-06-01 PROCEDURE — 83690 ASSAY OF LIPASE: CPT

## 2021-06-01 PROCEDURE — 93005 ELECTROCARDIOGRAM TRACING: CPT

## 2021-06-01 PROCEDURE — 96376 TX/PRO/DX INJ SAME DRUG ADON: CPT

## 2021-06-01 PROCEDURE — 85025 COMPLETE CBC W/AUTO DIFF WBC: CPT

## 2021-06-01 PROCEDURE — 96375 TX/PRO/DX INJ NEW DRUG ADDON: CPT

## 2021-06-01 PROCEDURE — 71045 X-RAY EXAM CHEST 1 VIEW: CPT

## 2021-06-01 PROCEDURE — 81001 URINALYSIS AUTO W/SCOPE: CPT

## 2021-06-01 PROCEDURE — 80053 COMPREHEN METABOLIC PANEL: CPT

## 2021-06-01 PROCEDURE — 96361 HYDRATE IV INFUSION ADD-ON: CPT

## 2021-06-01 PROCEDURE — 84484 ASSAY OF TROPONIN QUANT: CPT

## 2021-06-01 NOTE — RAD
Study: XR CHEST 1V



Indication: Shortness of breath.



Comparison: None.



Findings:



Mild basilar volume loss. No confluent infiltrate, layering effusion or pneumothorax. The cardiomedia
stinal silhouette and abena are within normal limits.



Impression:



Mild basilar volume loss. No lobar consolidation or pneumothorax.



Electronically signed by: KAL SHER MD (6/1/2021 5:43 PM) [facility]

## 2021-06-01 NOTE — RAD
Examination: Ultrasound abdomen limited



HISTORY: History of right upper quadrant pain



COMPARISON: None available



FINDINGS:



The liver length measures 16.8 cm. Cystic structure identified in the left lobe of the liver measurin
g 1.9 cm likely cyst. The gallbladder wall thickness measures 1.4 mm. The common bile duct measures 4
 mm in transverse dimension. The gallbladder is mildly distended. No evidence of gallstones identifie
d. The right kidney measures 11.2 x 4.0 x 4.8 cm. Minimal prominent right renal pelvis could be promi
nent extrarenal pelvis or mild hydronephrosis. The pancreas, aorta, IVC are not well-visualized due t
o bowel gas.



IMPRESSION:



1. No evidence of gallstones.

2. Mild prominent right renal pelvis could be hydronephrosis or extrarenal pelvis.



Electronically signed by: Juanpablo Holcomb MD (6/1/2021 6:03 PM) [id number]

## 2021-06-01 NOTE — PHYS DOC
Past History


Past Medical History:  Cancer, Depression, Diverticulitis, Other


Additional Past Medical Histor:  colonic abscess with adhesions


Past Surgical History:  Appendectomy, Cancer Surgery, Other


Additional Past Surgical Histo:  ileostomy, ileostomy reversal


Smoking:  Non-smoker


Alcohol Use:  Rarely


Drug Use:  None





Adult General


Chief Complaint


Chief Complaint:  FLANK PAIN





HPI


HPI





Patient is a 50-year-old female presenting for right flank pain.  Onset was 

yesterday without any known inciting event or trauma.  Reports she was 

celebrating holiday weekend and drank x2 beers and x1 glass of wine which she 

admits is more than she typically ever drinks.  Nonetheless, she reports having 

right flank pain that radiates around to anterior abdomen.  Nothing known makes 

better, deep breaths in, palpation under right rib cage, and certain movements 

make worse.  Timing of symptoms has been constant since onset and exacerbated by

factors just mention.  Pain is sharp and 10/10 severity in nature.  Admits 

complicated abdominal history, had prior diverticulitis with complications that 

ultimately required surgical fixation and ileostomy.  This surgery course was co

mplicated as she developed small bowel obstruction and subsequently had 

appendectomy during same procedure.  Has known midline hernia for which she sees

Dr. Lyman at [facility] in outpatient setting and has plans for 

outpatient surgical repair June 19.  No fever, dizziness, vision changes, chest 

pain, ripping or tearing sensation in chest, dysuria, vaginal bleeding





Review of Systems


Review of Systems


Fourteen body systems of review of systems have been reviewed. See HPI for 

pertinent positives and negative responses, other wise all other systems are 

negative, non-pertinent or non-contributory





Current Medications


Current Medications





Current Medications








 Medications


  (Trade)  Dose


 Ordered  Sig/[facility]  Start Time


 Stop Time Status Last Admin


Dose Admin


 


 Fentanyl Citrate


  (Fentanyl 2ml


 Vial)  100 mcg  STK-MED ONCE  6/1/21 16:23


 6/1/21 16:24 DC  





 


 Hydromorphone HCl


  (Dilaudid)  0.5 mg  1X  ONCE  6/1/21 16:30


 6/1/21 16:31 DC 6/1/21 16:00


0.5 MG


 


 Info


  (Do NOT chart on


 this entry -- for


 MONITORING)  1 each  PRN DAILY  PRN  6/1/21 16:00


 6/3/21 15:59   





 


 Iohexol


  (Omnipaque 300


 Mg/ml)  75 ml  1X  ONCE  6/1/21 16:00


 6/1/21 16:01 DC 6/1/21 16:16


75 ML


 


 Ondansetron HCl


  (Zofran)  4 mg  STK-MED ONCE  6/1/21 16:31


 6/1/21 16:31 DC  














Allergies


Allergies





Allergies








Coded Allergies Type Severity Reaction Last Updated Verified


 


  cephalexin Allergy Unknown  6/3/19 Yes











Physical Exam


Physical Exam


Constitutional: Well developed, well nourished, tearful and appears in 

significant pain


HENT: Normocephalic, atraumatic, bilateral external ears normal, oropharynx dry,

no oral exudates, nose normal. 


Eyes: PERRLA, EOMI, conjunctiva normal, no discharge.  


Neck: Normal range of motion, no tenderness, supple, no stridor.  


Cardiovascular: Heart rate regular, sinus rhythm, no murmurs rubs or gallops


Lungs & Thorax:  Bilateral breath sounds clear to auscultation 


Abdomen: Bowel sounds normal, soft, positive Ribeiro sign with guarding present, 

no rebound, midline ventral hernia present without pulsatile masses.  

Nonsurgical abdomen, no peritoneal signs


Skin: Warm, dry, no erythema, no rash.  


Back: No tenderness, right CVA tenderness


Extremities: No tenderness, no cyanosis, no clubbing, ROM intact, no edema.  


Neurologic: Alert and oriented X 3, grossly normal motor & sensory function, no 

focal deficits noted. 


Psychologic: Tearful affect, judgement normal, mood normal.





Current Patient Data


Vital Signs





                                   Vital Signs








  Date Time  Temp Pulse Resp B/P (MAP) Pulse Ox O2 Delivery O2 Flow Rate FiO2


 


6/1/21 16:25     94 Room Air  


 


6/1/21 15:58 99.0 76 20 149/86 (107)    








Lab Results





                                Laboratory Tests








Test


 6/1/21


15:30


 


White Blood Count


 8.9 x10^3/uL


(4.0-11.0)


 


Red Blood Count


 4.43 x10^6/uL


(3.50-5.40)


 


Hemoglobin


 13.1 g/dL


(12.0-15.5)


 


Hematocrit


 39.2 %


(36.0-47.0)


 


Mean Corpuscular Volume


 88 fL ()





 


Mean Corpuscular Hemoglobin 30 pg (25-35)  


 


Mean Corpuscular Hemoglobin


Concent 33 g/dL


(31-37)


 


Red Cell Distribution Width


 14.1 %


(11.5-14.5)


 


Platelet Count


 259 x10^3/uL


(140-400)


 


Neutrophils (%) (Auto) 69 % (31-73)  


 


Lymphocytes (%) (Auto) 21 % (24-48)  L


 


Monocytes (%) (Auto) 8 % (0-9)  


 


Eosinophils (%) (Auto) 1 % (0-3)  


 


Basophils (%) (Auto) 1 % (0-3)  


 


Neutrophils # (Auto)


 6.2 x10^3uL


(1.8-7.7)


 


Lymphocytes # (Auto)


 1.9 x10^3/uL


(1.0-4.8)


 


Monocytes # (Auto)


 0.7 x10^3/uL


(0.0-1.1)


 


Eosinophils # (Auto)


 0.1 x10^3/uL


(0.0-0.7)


 


Basophils # (Auto)


 0.1 x10^3/uL


(0.0-0.2)


 


Urine Collection Type Unknown  


 


Urine Color Yellow  


 


Urine Clarity Clear  


 


Urine pH 6.0  


 


Urine Specific Gravity <=1.005  


 


Urine Protein


 Neg


(NEG-TRACE)


 


Urine Glucose (UA)


 Neg mg/dL


(NEG)


 


Urine Ketones (Stick)


 Neg mg/dL


(NEG)


 


Urine Blood Trace (NEG)  


 


Urine Nitrite Neg (NEG)  


 


Urine Bilirubin Neg (NEG)  


 


Urine Urobilinogen Dipstick


 0.2 mg/dL (0.2


mg/dL)


 


Urine Leukocyte Esterase Neg (NEG)  


 


Urine RBC 0 /HPF (0-2)  


 


Urine WBC 0 /HPF (0-4)  


 


Urine Squamous Epithelial


Cells Occ /LPF  





 


Urine Bacteria


 0 /HPF (0-FEW)





 


Sodium Level


 141 mmol/L


(136-145)


 


Potassium Level


 5.2 mmol/L


(3.5-5.1)  H


 


Chloride Level


 104 mmol/L


()


 


Carbon Dioxide Level


 29 mmol/L


(21-32)


 


Anion Gap 8 (6-14)  


 


Blood Urea Nitrogen


 17 mg/dL


(7-20)


 


Creatinine


 0.6 mg/dL


(0.6-1.0)


 


Estimated GFR


(Cockcroft-Gault) 105.4  





 


BUN/Creatinine Ratio 28 (6-20)  H


 


Glucose Level


 101 mg/dL


(70-99)  H


 


Calcium Level


 9.2 mg/dL


(8.5-10.1)


 


Total Bilirubin


 0.6 mg/dL


(0.2-1.0)


 


Aspartate Amino Transferase


(AST) 19 U/L (15-37)





 


Alanine Aminotransferase (ALT)


 16 U/L (14-59)





 


Alkaline Phosphatase


 94 U/L


()


 


Troponin I Quantitative


 < 0.017 ng/mL


(0-0.055)


 


Total Protein


 7.2 g/dL


(6.4-8.2)


 


Albumin


 4.0 g/dL


(3.4-5.0)


 


Albumin/Globulin Ratio 1.3 (1.0-1.7)  


 


Lipase


 61 U/L


()  L











EKG


EKG


EKG ordered and interpreted by myself 1625 hrs. as sinus rhythm at 73 bpm, no 

axis deviation, no STEMI





Radiology/Procedures


Radiology/Procedures





Exam: CT abdomen/pelvis with intravenous contrast





Indication: Severe right upper quadrant pain, right flank pain. History of colon

and small bowel resection. Cervical cancer.





Comparison: CT abdomen pelvis 1/7/2020





Technique: Helical CT imaging performed of the abdomen and pelvis after the 

intravenous administration of contrast. Sagittal and coronal reformats were 

obtained. 





One or more of the following individualized dose reduction techniques were 

utilized for this examination:  





1. Automated exposure control


2. Adjustment of the mA and/or kV according to patient size


3. Use of iterative reconstruction technique.





Findings:





Lower chest: Mild atelectasis in the lung bases. Heart is normal in size.





Liver: There is a 1.8 cm fluid density cyst in the left hepatic lobe. A 4 mm 

hypodensity in the posterior right hepatic lobe is too small to characterize.





Gallbladder/Biliary Tree: Vague hyperdense material layering in the gallbladder 

suspicious for sludge. Bile ducts are within normal limits.





Pancreas: Normal.





Spleen: Normal.





Adrenal Glands: Normal.





Kidneys/Ureters/Bladder: Kidneys, ureters, and bladder are normal. There are 

bilateral peripelvic cysts.





Reproductive Organs: Uterus is surgically absent. No adnexal mass 





Stomach, small bowel, and colon: There are surgical changes of sleeve 

gastrectomy. The small bowel is normal. Surgical changes of the colon with an 

anastomosis in the cecum and in the rectum.





Vasculature: Abdominal aorta is normal in caliber.





Lymph Nodes: No lymphadenopathy.





Peritoneum and retroperitoneum: No free fluid or free air.





Bones: No acute osseous abnormality.





Miscellaneous: There is a new large ventral hernia with a 6.7 cm opening 

containing normal-appearing loops of small bowel. No evidence of complication. 

There is soft tissue scarring in the right lower quadrant from previous 

ileostomy.





Impression:





1.  No acute abnormality in the abdomen and pelvis.





2.  New large ventral hernia containing loops of small bowel. No evidence of 

bowel obstruction or other acute complication.





3.  Probable sludge in the gallbladder.





4.  Surgical changes of the stomach, small bowel, and colon.





5.  Probable hepatic cysts.





Electronically signed by: Amanda Stratton MD (6/1/2021 4:49 PM) [id number]





//////////////











Study: XR CHEST 1V





Indication: Shortness of breath.





Comparison: None.





Findings:





Mild basilar volume loss. No confluent infiltrate, layering effusion or 

pneumothorax. The cardiomediastinal silhouette and abena are within normal 

limits.





Impression:





Mild basilar volume loss. No lobar consolidation or pneumothorax.





Electronically signed by: KAL SHER MD (6/1/2021 5:43 PM) [facility]








///////////////











Examination: Ultrasound abdomen limited





HISTORY: History of right upper quadrant pain





COMPARISON: None available





FINDINGS:





The liver length measures 16.8 cm. Cystic structure identified in the left lobe 

of the liver measuring 1.9 cm likely cyst. The gallbladder wall thickness 

measures 1.4 mm. The common bile duct measures 4 mm in transverse dimension. The

gallbladder is mildly distended. No evidence of gallstones identified. The right

kidney measures 11.2 x 4.0 x 4.8 cm. Minimal prominent right renal pelvis could 

be prominent extrarenal pelvis or mild hydronephrosis. The pancreas, aorta, IVC 

are not well-visualized due to bowel gas.





IMPRESSION:





1. No evidence of gallstones.


2. Mild prominent right renal pelvis could be hydronephrosis or extrarenal 

pelvis.





Electronically signed by: Juanpablo Holcomb MD (6/1/2021 6:03 PM) [id number]





Heart Score


C/O Chest Pain:  No


HEART Score for Chest Pain:  








HEART Score for Chest Pain Response (Comments) Value


 


History Slighlty/Non-Suspicious 0


 


ECG Normal 0


 


Age >45 - < 65 1


 


Risk Factors                            1 or 2 Risk Factors 1


 


Troponin < Normal Limit 0


 


Total  2








Risk Factors:


Risk Factors:  DM, Current or recent (<one month) smoker, HTN, HLP, family 

history of CAD, obesity.


Risk Scores:


Risk Factors:  DM, Current or recent (<one month) smoker, HTN, HLP, family 

history of CAD, obesity.





Course & Med Decision Making


Course & Med Decision Making


Hemodynamically stable with HPI concerning for prior complicated intra-abdominal

issues, physical exam nonconcerning for an acute abdomen


Comprehensive ER work-up obtained concerning for ventral hernia otherwise no 

emergent or surgical issues readily present.  With that said, patient's pain and

overall clinical picture did not improve with ER intervention that included 1 mg

IV Dilaudid and a total of 150mcg Fentanyl


I contacted Dr. Echavarria, surgeon at [facility] and reviewed ER 

imaging and abdominal exam.  He agreed need for hospital transfer for admission 

for serial abdominal exams, pain control and examination in the morning unless 

something changes in the interim


I contacted hospitalist, Dr. Luz at [facility] and discussed 

need for transfer, he agreed and accepted patient under his care


I updated patient and  at bedside on proposed plan of care that included 

hospital transfer for continued IV fluid, IV pain control, n.p.o. status and 

serial abdominal exams with surgery consultation.  They were amenable.  All 

questions and concerns addressed prior to ER transfer











Critical Care Time


This patient required critical care. Due to the fact that the patient required a

significant amount of one on one physician - patient contact time, ordering and 

review of studies, arranging urgent treatment with development of a management 

plan, evaluation of patients response to treatment with frequent reassessments,

and discussions with other providers this patient required 50 minutes of 

critical care time. Critical care time was indicated due to the inherent instab

ility and/or potential for instability in this patient. The critical care time 

that is allocated to this patient is above and beyond any time spent on any 

other billable procedures performed on this patient.





Dragon Disclaimer


Dragon Disclaimer


This electronic medical record was generated, in whole or in part, using a voice

recognition dictation system.





Departure


Departure:


Impression:  


   Primary Impression:  


   Abdominal pain


   Additional Impressions:  


   Ventral hernia without obstruction or gangrene


   History of diverticulitis


   History of ileostomy


Disposition:  02 [facility]M [facility]


Admitting Physician:  Gretta Luz


Condition:  STABLE


Referrals:  


ASHLEE BOWEN (PCP)





Problem Qualifiers











EDA MONTESINOS DO                  Jun 1, 2021 17:02

## 2021-06-01 NOTE — RAD
Exam: CT abdomen/pelvis with intravenous contrast



Indication: Severe right upper quadrant pain, right flank pain. History of colon and small bowel rese
ction. Cervical cancer.



Comparison: CT abdomen pelvis 1/7/2020



Technique: Helical CT imaging performed of the abdomen and pelvis after the intravenous administratio
n of contrast. Sagittal and coronal reformats were obtained. 



One or more of the following individualized dose reduction techniques were utilized for this examinat
ion:  



1. Automated exposure control

2. Adjustment of the mA and/or kV according to patient size

3. Use of iterative reconstruction technique.



Findings:



Lower chest: Mild atelectasis in the lung bases. Heart is normal in size.



Liver: There is a 1.8 cm fluid density cyst in the left hepatic lobe. A 4 mm hypodensity in the poste
rior right hepatic lobe is too small to characterize.



Gallbladder/Biliary Tree: Vague hyperdense material layering in the gallbladder suspicious for sludge
. Bile ducts are within normal limits.



Pancreas: Normal.



Spleen: Normal.



Adrenal Glands: Normal.



Kidneys/Ureters/Bladder: Kidneys, ureters, and bladder are normal. There are bilateral peripelvic cys
ts.



Reproductive Organs: Uterus is surgically absent. No adnexal mass 



Stomach, small bowel, and colon: There are surgical changes of sleeve gastrectomy. The small bowel is
 normal. Surgical changes of the colon with an anastomosis in the cecum and in the rectum.



Vasculature: Abdominal aorta is normal in caliber.



Lymph Nodes: No lymphadenopathy.



Peritoneum and retroperitoneum: No free fluid or free air.



Bones: No acute osseous abnormality.



Miscellaneous: There is a new large ventral hernia with a 6.7 cm opening containing normal-appearing 
loops of small bowel. No evidence of complication. There is soft tissue scarring in the right lower q
uadrant from previous ileostomy.



Impression:



1.  No acute abnormality in the abdomen and pelvis.



2.  New large ventral hernia containing loops of small bowel. No evidence of bowel obstruction or oth
er acute complication.



3.  Probable sludge in the gallbladder.



4.  Surgical changes of the stomach, small bowel, and colon.



5.  Probable hepatic cysts.



Electronically signed by: Amanda Stratton MD (6/1/2021 4:49 PM) [id number]

## 2021-06-21 ENCOUNTER — HOSPITAL ENCOUNTER (EMERGENCY)
Dept: HOSPITAL 63 - ER | Age: 51
Discharge: HOME | End: 2021-06-21
Payer: OTHER GOVERNMENT

## 2021-06-21 VITALS
SYSTOLIC BLOOD PRESSURE: 147 MMHG | DIASTOLIC BLOOD PRESSURE: 81 MMHG | SYSTOLIC BLOOD PRESSURE: 147 MMHG | DIASTOLIC BLOOD PRESSURE: 81 MMHG | SYSTOLIC BLOOD PRESSURE: 147 MMHG | DIASTOLIC BLOOD PRESSURE: 81 MMHG

## 2021-06-21 VITALS — BODY MASS INDEX: 28.94 KG/M2 | WEIGHT: 173.72 LBS | HEIGHT: 65 IN

## 2021-06-21 DIAGNOSIS — R10.33: ICD-10-CM

## 2021-06-21 DIAGNOSIS — R19.7: ICD-10-CM

## 2021-06-21 DIAGNOSIS — R10.11: Primary | ICD-10-CM

## 2021-06-21 DIAGNOSIS — Z90.89: ICD-10-CM

## 2021-06-21 DIAGNOSIS — F32.9: ICD-10-CM

## 2021-06-21 DIAGNOSIS — G89.18: ICD-10-CM

## 2021-06-21 DIAGNOSIS — Z88.1: ICD-10-CM

## 2021-06-21 LAB
ALBUMIN SERPL-MCNC: 3.7 G/DL (ref 3.4–5)
ALP SERPL-CCNC: 90 U/L (ref 46–116)
ALT SERPL-CCNC: 14 U/L (ref 14–59)
ANION GAP SERPL CALC-SCNC: 7 MMOL/L (ref 6–14)
AST SERPL-CCNC: 12 U/L (ref 15–37)
BASOPHILS # BLD AUTO: 0.1 X10^3/UL (ref 0–0.2)
BASOPHILS NFR BLD: 1 % (ref 0–3)
BILIRUB DIRECT SERPL-MCNC: 0.1 MG/DL (ref 0–0.2)
BILIRUB SERPL-MCNC: 0.4 MG/DL (ref 0.2–1)
CA-I SERPL ISE-MCNC: 20 MG/DL (ref 7–20)
CALCIUM SERPL-MCNC: 9 MG/DL (ref 8.5–10.1)
CHLORIDE SERPL-SCNC: 106 MMOL/L (ref 98–107)
CO2 SERPL-SCNC: 28 MMOL/L (ref 21–32)
CREAT SERPL-MCNC: 0.9 MG/DL (ref 0.6–1)
EOSINOPHIL NFR BLD: 0.1 X10^3/UL (ref 0–0.7)
EOSINOPHIL NFR BLD: 1 % (ref 0–3)
ERYTHROCYTE [DISTWIDTH] IN BLOOD BY AUTOMATED COUNT: 13.7 % (ref 11.5–14.5)
GFR SERPLBLD BASED ON 1.73 SQ M-ARVRAT: 66 ML/MIN
GLUCOSE SERPL-MCNC: 103 MG/DL (ref 70–99)
HCT VFR BLD CALC: 39.3 % (ref 36–47)
HGB BLD-MCNC: 13.2 G/DL (ref 12–15.5)
LIPASE: 135 U/L (ref 73–393)
LYMPHOCYTES # BLD: 2.2 X10^3/UL (ref 1–4.8)
LYMPHOCYTES NFR BLD AUTO: 26 % (ref 24–48)
MCH RBC QN AUTO: 29 PG (ref 25–35)
MCHC RBC AUTO-ENTMCNC: 33 G/DL (ref 31–37)
MCV RBC AUTO: 87 FL (ref 79–100)
MONO #: 0.6 X10^3/UL (ref 0–1.1)
MONOCYTES NFR BLD: 7 % (ref 0–9)
NEUT #: 5.6 X10^3UL (ref 1.8–7.7)
NEUTROPHILS NFR BLD AUTO: 65 % (ref 31–73)
PLATELET # BLD AUTO: 291 X10^3/UL (ref 140–400)
POTASSIUM SERPL-SCNC: 4 MMOL/L (ref 3.5–5.1)
PROT SERPL-MCNC: 7.4 G/DL (ref 6.4–8.2)
RBC # BLD AUTO: 4.53 X10^6/UL (ref 3.5–5.4)
SODIUM SERPL-SCNC: 141 MMOL/L (ref 136–145)
WBC # BLD AUTO: 8.5 X10^3/UL (ref 4–11)

## 2021-06-21 PROCEDURE — 83690 ASSAY OF LIPASE: CPT

## 2021-06-21 PROCEDURE — 85025 COMPLETE CBC W/AUTO DIFF WBC: CPT

## 2021-06-21 PROCEDURE — 96361 HYDRATE IV INFUSION ADD-ON: CPT

## 2021-06-21 PROCEDURE — 96374 THER/PROPH/DIAG INJ IV PUSH: CPT

## 2021-06-21 PROCEDURE — 82550 ASSAY OF CK (CPK): CPT

## 2021-06-21 PROCEDURE — 74177 CT ABD & PELVIS W/CONTRAST: CPT

## 2021-06-21 PROCEDURE — 80048 BASIC METABOLIC PNL TOTAL CA: CPT

## 2021-06-21 PROCEDURE — 96375 TX/PRO/DX INJ NEW DRUG ADDON: CPT

## 2021-06-21 PROCEDURE — 80076 HEPATIC FUNCTION PANEL: CPT

## 2021-06-21 PROCEDURE — 99285 EMERGENCY DEPT VISIT HI MDM: CPT

## 2021-06-21 PROCEDURE — 36415 COLL VENOUS BLD VENIPUNCTURE: CPT

## 2021-06-21 NOTE — RAD
INDICATION: Reason: ruq pain, 18 days postop acute chen / Spl. Instructions:  / History: .  



COMPARISON: June 1, 2021



TECHNIQUE:



Axial CT images obtained through the abdomen and pelvis with contrast.



One or more of the following individualized dose reduction techniques were utilized for this examinat
ion:  1. Automated exposure control;  2. Adjustment of the mA and/or kV according to patient size;  3
. Use of iterative reconstruction technique.



FINDINGS:



Abdominal aorta is not aneurysmal. There is some scattered plaque.

Prominence of the bile ducts postcholecystectomy which is a common finding postoperatively. Probable 
cyst at the left lobe the liver.

Small amount of edema in the fat near gallbladder fossa without drainable fluid collection in the are
a. There is also subcutaneous edema to the fat which could be related to the patient's surgery.

No peripancreatic fluid collection.

Spleen unremarkable.

Repeat demonstration of prominence of the bilateral renal pelvis which is similar to prior.

Urinary bladder is partially distended.

Colonic diverticulosis.

Postoperative changes to the right-sided colon with suture line seen.

No dilated loops of bowel to suggest obstruction.

Degenerative changes of the spine. Multilevel central canal and neural foraminal stenosis.



IMPRESSION:



*  Prominence of the bile ducts which is commonly seen postcholecystectomy. No drainable fluid collec
tion at the cholecystectomy site.



*  Repeat demonstration of prominence of bilateral renal pelvis.



*  No evidence of bowel obstruction.



Electronically signed by: Deric Garcia MD (6/21/2021 5:25 PM) [id number]

## 2021-06-21 NOTE — PHYS DOC
Past History


Past Medical History:  Cancer, Depression, Diverticulitis, Other


Additional Past Medical Histor:  colonic abscess with adhesions


Past Surgical History:  Appendectomy, Cancer Surgery, Cholecystectomy, Other


Additional Past Surgical Histo:  ileostomy, ileostomy reversal, HERNIA REPAIR


Smoking:  Non-smoker


Alcohol Use:  Rarely


Drug Use:  None





General Adult


EDM:


Chief Complaint:  ABDOMINAL PAIN





HPI:


HPI:


50-year-old female past medical history of depression and diverticulitis, 

complicated with perforation, colostomy with reversal, presents to the ED with 

complaints of periumbilical abdominal pain and right upper quadrant abdominal 

pain stating " I am 18 days postop."  Reports acute cholecystectomy and hernia 

repair with mesh by Dr. Echavarria, "he sent me here, I have a very high pain 

tolerance." States she doesn't take her oxycodone prescribed because "I don't 

like how it makes me feel."  Patient requested a look at her umbilical incision 

stating "my suture is sticking out and bothering me." Reports multiple loose 

stools daily.





Review of Systems:


Review of Systems:


Constitutional:  Denies fever or chills 


Eyes:  Denies change in visual acuity 


HENT:  Denies nasal congestion or sore throat 


Respiratory:  Denies cough or shortness of breath 


Cardiovascular:  Denies chest pain or edema 


GI:  Denies constipation or bloody stools 


: Denies dysuria or vaginal bleeding


Musculoskeletal:  Denies back pain or joint pain 


Integument:  Denies rash or diaphoresis


Neurologic:  Denies headache, focal weakness or sensory changes 


Endocrine:  Denies polyuria or polydipsia 


Lymphatic:  Denies swollen glands 


Psychiatric:  Denies depression or anxiety





Current Medications:


Current Meds:





Current Medications








 Medications


  (Trade)  Dose


 Ordered  Sig/Blaine  Start Time


 Stop Time Status Last Admin


Dose Admin


 


 Hydromorphone HCl


  (Dilaudid)  1 mg  1X  ONCE  21 16:15


 21 16:16 DC 21 16:15


1 MG


 


 Iohexol


  (Omnipaque 300


 Mg/ml)  75 ml  1X  ONCE  21 16:15


 21 16:16 DC 21 16:33


75 ML


 


 Ondansetron HCl


  (Zofran)  8 mg  1X  ONCE  21 16:15


 21 16:16 DC 21 16:14


8 MG


 


 Sodium Chloride  1,000 ml @ 


 1,000 mls/hr  Q1H  21 16:15


 21 17:14 DC 21 16:12


1,000 MLS/HR











Allergies:


Allergies:





Allergies








Coded Allergies Type Severity Reaction Last Updated Verified


 


  cephalexin Allergy Unknown  6/3/19 Yes











Physical Exam:


PE:





Constitutional: Well developed, well nourished, 


HENT: Normocephalic, atraumatic, moist mucous membranes


Eyes: EOMI, conjunctiva normal, no discharge.  


Neck: Normal range of motion,  supple, 


Cardiovascular: S1/2 present, regular rhythm


Lungs & Thorax: Speaking in full sentences, bilateral equal chest rise, no 

tachypnea or increased work of breathing


Abdomen:  soft, mild tenderness just above the umbilicus over incision site, no 

wound dehiscence, vertical periumbilical incision site approximately 3 cm is 

clean/dry/intact, no rash or purulent drainage, proximal aspect laceration was 

small hard yellow lesion (dried skin vs suture?)


Skin: Warm, dry, no erythema, no rash. [] 


Back: No tenderness, no CVA tenderness. [] 


Extremities: No tenderness, no cyanosis, 


Neurologic: Alert and oriented X 3, normal motor function, normal sensory 

function, no focal deficits noted. []


Psychologic: Affect normal, judgement normal, mood normal. []





Current Patient Data:


Labs:





                                Laboratory Tests








Test


 21


16:06


 


White Blood Count


 8.5 x10^3/uL


(4.0-11.0)


 


Red Blood Count


 4.53 x10^6/uL


(3.50-5.40)


 


Hemoglobin


 13.2 g/dL


(12.0-15.5)


 


Hematocrit


 39.3 %


(36.0-47.0)


 


Mean Corpuscular Volume


 87 fL ()





 


Mean Corpuscular Hemoglobin 29 pg (25-35)  


 


Mean Corpuscular Hemoglobin


Concent 33 g/dL


(31-37)


 


Red Cell Distribution Width


 13.7 %


(11.5-14.5)


 


Platelet Count


 291 x10^3/uL


(140-400)


 


Neutrophils (%) (Auto) 65 % (31-73)  


 


Lymphocytes (%) (Auto) 26 % (24-48)  


 


Monocytes (%) (Auto) 7 % (0-9)  


 


Eosinophils (%) (Auto) 1 % (0-3)  


 


Basophils (%) (Auto) 1 % (0-3)  


 


Neutrophils # (Auto)


 5.6 x10^3uL


(1.8-7.7)


 


Lymphocytes # (Auto)


 2.2 x10^3/uL


(1.0-4.8)


 


Monocytes # (Auto)


 0.6 x10^3/uL


(0.0-1.1)


 


Eosinophils # (Auto)


 0.1 x10^3/uL


(0.0-0.7)


 


Basophils # (Auto)


 0.1 x10^3/uL


(0.0-0.2)


 


Sodium Level


 141 mmol/L


(136-145)


 


Potassium Level


 4.0 mmol/L


(3.5-5.1)


 


Chloride Level


 106 mmol/L


()


 


Carbon Dioxide Level


 28 mmol/L


(21-32)


 


Anion Gap 7 (6-14)  


 


Blood Urea Nitrogen


 20 mg/dL


(7-20)


 


Creatinine


 0.9 mg/dL


(0.6-1.0)


 


Estimated GFR


(Cockcroft-Gault) 66.0  





 


Glucose Level


 103 mg/dL


(70-99)  H


 


Calcium Level


 9.0 mg/dL


(8.5-10.1)


 


Total Bilirubin


 0.4 mg/dL


(0.2-1.0)


 


Direct Bilirubin


 0.1 mg/dL


(0.0-0.2)


 


Aspartate Amino Transferase


(AST) 12 U/L (15-37)


L


 


Alanine Aminotransferase (ALT)


 14 U/L (14-59)





 


Alkaline Phosphatase


 90 U/L


()


 


Creatine Kinase


 39 U/L


()


 


Total Protein


 7.4 g/dL


(6.4-8.2)


 


Albumin


 3.7 g/dL


(3.4-5.0)


 


Lipase


 135 U/L


()








Vital Signs:





                                   Vital Signs








  Date Time  Temp Pulse Resp B/P (MAP) Pulse Ox O2 Delivery O2 Flow Rate FiO2


 


21 16:15   18     


 


21 15:30 98.8 99  135/86 (102) 99 Room Air  











EKG:


EKG:


[]





Radiology/Procedures:


Radiology/Procedures:


                                 IMAGING REPORT





                                     Signed





PATIENT: SARA FREED ACCOUNT: [id number]     MRN#: [id number]


: 1970           LOCATION: ER              AGE: 50


SEX: F                    EXAM DT: 21         ACCESSION#: [id number]


STATUS: REG ER            ORD. PHYSICIAN: SEBLE DIAZ DO


REASON: ruq pain, 18 days postop acute chen


PROCEDURE: CT ABD PELV W/ IV CONTRST ONLY





INDICATION: Reason: ruq pain, 18 days postop acute chen / Spl. Instructions:  /

 History: .  





COMPARISON: 2021





TECHNIQUE:





Axial CT images obtained through the abdomen and pelvis with contrast.





One or more of the following individualized dose reduction techniques were 

utilized for this examination:  1. Automated exposure control;  2. Adjustment of

 the mA and/or kV according to patient size;  3. Use of iterative reconstruction

 technique.





FINDINGS:





Abdominal aorta is not aneurysmal. There is some scattered plaque.


Prominence of the bile ducts postcholecystectomy which is a common finding 

postoperatively. Probable cyst at the left lobe the liver.


Small amount of edema in the fat near gallbladder fossa without drainable fluid 

collection in the area. There is also subcutaneous edema to the fat which could 

be related to the patient's surgery.


No peripancreatic fluid collection.


Spleen unremarkable.


Repeat demonstration of prominence of the bilateral renal pelvis which is 

similar to prior.


Urinary bladder is partially distended.


Colonic diverticulosis.


Postoperative changes to the right-sided colon with suture line seen.


No dilated loops of bowel to suggest obstruction.


Degenerative changes of the spine. Multilevel central canal and neural foraminal

 stenosis.





IMPRESSION:





*  Prominence of the bile ducts which is commonly seen postcholecystectomy. No 

drainable fluid collection at the cholecystectomy site.





*  Repeat demonstration of prominence of bilateral renal pelvis.





*  No evidence of bowel obstruction.





Electronically signed by: Michelle Blancas MD (2021 5:25 PM) [id number]














DICTATED AND SIGNED BY:     MICHELLE BLANCAS MD


DATE:     21





CC: ASHLEE BOWEN; SEBLE DIAZ DO ~MTH0 0





Heart Score:


C/O Chest Pain:  No


Risk Factors:


Risk Factors:  DM, Current or recent (<one month) smoker, HTN, HLP, family 

history of CAD, obesity.


Risk Scores:


Score 0 - 3:  2.5% MACE over next 6 weeks - Discharge Home


Score 4 - 6:  20.3% MACE over next 6 weeks - Admit for Clinical Observation


Score 7 - 10:  72.7% MACE over next 6 weeks - Early Invasive Strategies





Course & Med Decision Making:


Course & Med Decision Making


Pertinent Labs and Imaging studies reviewed. (See chart for details)





Concern for abdominal pain and wound check. D/w Dr. Echavarria. surgery.  He advised 

me to reassure patient that these are normal postoperative complications from 

cholecystectomy.  Recommend low-fat diet, Tylenol or ibuprofen as needed for 

pain, Creon and cholestyramine, with follow-up in 2 weeks.  Will discharge home 

with strict ED return precautions were given for severe pain, melena 

hematochezia, dehydration, syncope neurologic deficits. Encouraged urgent 

outpatient follow-up with PMD and surgery.  Life-threatening processes were 

considered but are low suspicion at this time, given history, physical exam and 

ED workup. Pt was educated on all prescription medications and adverse effects. 

 All patient's questions were answered and pt was stable at time of discharge.





Life/limb-threatening differential includes but is not limited to, aortic 

dissection, aortic aneurysm, acute coronary syndrome, surgical abdomen 

(appendicitis, cholecystitis, ischemic bowel, strangulated hernia, etc), bowel 

obstruction or volvulus, bladder outlet obstruction, gastrointestinal bleeding, 

inflammatory bowel disease, peptic ulcer disease, ACS/CAD, sepsis, diverticular 

disease, ureterolithiasis,  nephrolithiasis, ovarian or testicular torsion, 

ectopic pregnancy, vaginal hemorrhage, or genitourinary infection.





I spoken with the patient and her caregivers.  I explained the patient's 

condition, diagnoses and treatment plan based on the information available to me

 at this time.  I have answered the patient and her caregiver's questions and a

ddressed any concerns.  The patient and her caregivers have a good understanding

 of patient's diagnosis, condition and treatment plan as can be expected at this

 point.  Vital signs have been stable.  Patient's condition is stable and 

appropriate for discharge from the emergency department. 





Patient will pursue further outpatient evaluation with primary care physician or

 other designated or consulting physician as outlined in the discharge 

instructions.  The patient and/or caregivers are agreeable to this plan of care 

and follow-up instructions have been explained in detail.  The patient and/or 

caregivers have received these instructions in written form and have expressed 

an understanding of the discharge instructions.  The patient and/or caregivers 

are aware that any significant change of condition or worsening of symptoms 

should prompt immediate return to this or the closest emergency department or 

call to [facility] Disclaimer:


Dragon Disclaimer:


This electronic medical record was generated, in whole or in part, using a voice

 recognition dictation system.





Departure


Departure:


Impression:  


   Primary Impression:  


   Abdominal pain


   Additional Impression:  


   Loose stools


Disposition:   HOME / SELF CARE / HOMELESS


Condition:  STABLE


Referrals:  


ASHLEE BOWEN (PCP)


this week for routine care


Patient Instructions:  Laparoscopic Cholecystectomy, Care After, Pain Relief 

Preoperatively and Postoperatively





Additional Instructions:  


FOLLOW UP WITH SURGERY:  FOR DEFINITIVE MANAGEMENT -Dr. Echavarria within 2 weeks


[facility] General Surgery


Address:


[address]


[place] [address]


Phone:


[phone]





EMERGENCY DEPARTMENT GENERAL DISCHARGE INSTRUCTIONS





Thank you for coming to [facility] (ED) today and trusting us

 with you 


care.  We trust that you had a positivie experience in our Emergency Department.

  If you 


wish to speak to the department management, you may call the director at 

[phone].





YOUR FOLLOW UP INSTRUCTIONS ARE AS FOLLOWS:





1.  Do you have a private Doctor?  If you do not have a private doctor, please 

ask for a 


resource list of physicians or clinics that may be able to assist you with 

follow up care.





2.  The Emergency Physician has interpreted your x-rays.  The X-Ray specialist 

will also 


review them.  If there is a change in the findings, you will be notified in 48 

hours when at 


all possible.





3.  A lab test or culture has been done, your results will be reviewed and you 

will be 


notified if you need a change in treatment.





ADDITIONAL INSTRUCTIONS AND INFORMATION:





1.  Your care today has been supervised by a physician who is specially trained 

in emergency 


care.  Many problems require more than one evaluation for a complete diagnosis 

and 


treatment.  We recommend that you schedule your follow up appointment as 

recommended to 


ensure complete treatment of you illness or injury.  If you are unable to obtain

 follow up 


care and continue to have a problem, or if your condition worsens, we recommend 

that you 


return to the ED.





2.  We are not able to safely determine your condition over the phone nor are we

 able to 


give sound medical advice over the phone.  For these safety reasons, if you call

 for medical 


advice we will ask you to come to the ED for further evaluation.





3.  If you have any questions regarding these discharge instructions please call

 the ED at 


[phone].





SAFETY INFORMATION:





In the interest of safety, wellness, and injury prevention; we encourage you to 

wear your 


sealbelt, if you smoke; quite smoking, and we encourage family to use a 

protective helmet 


for bicycling and other sporting events that present an increased risk for head 

injury.





IF YOUR SYMPTOMS WORSEN OR NEW SYMPTOMS DEVELOP, OR YOU HAVE CONCERNS ABOUT YOUR

 CONDITION; 


OR IF YOUR CONDITION WORSENS WHILE YOU ARE WAITING FOR YOUR FOLLOW UP APPOINT

MENT; EITHER 


CONTACT YOUR PRIMARY CARE DOCTOR, THE PHYSICIAN WHOSE NAME AND NUMBER YOU WERE 

GIVEN, OR 


RETURN TO THE ED IMMEDIATELY.


Scripts


Lipase/Protease/Amylase (CREON DR 12,000 UNITS CAPSULE) 1 Each Capsule.dr


1 EACH PO TID for loose stools postop MDD  99649 units for 30 Days, #90 CAP


   take with meals


   Prov: SEBLE DIAZ DO         21 


Cholestyramine (CHOLESTYRAMINE RESIN) 5 Gm Powder


4 GM PO QHS for loose stools for 30 Days, #120 GM 0 Refills


   Prov: SEBLE DIAZ DO         21 


Ibuprofen (IBUPROFEN) 600 Mg Tablet


600 MG PO Q6HRS for headache, #20 TAB


   Prov: SEBLE DIAZ DO         21











SEBLE DIAZ DO               2021 17:41

## 2021-08-09 ENCOUNTER — HOSPITAL ENCOUNTER (OUTPATIENT)
Dept: HOSPITAL 63 - NM | Age: 51
End: 2021-08-09
Payer: OTHER GOVERNMENT

## 2021-08-09 DIAGNOSIS — R14.0: ICD-10-CM

## 2021-08-09 DIAGNOSIS — R10.13: Primary | ICD-10-CM

## 2021-08-09 PROCEDURE — A9541 TC99M SULFUR COLLOID: HCPCS

## 2021-08-09 PROCEDURE — 78264 GASTRIC EMPTYING IMG STUDY: CPT

## 2021-08-09 NOTE — RAD
EXAM: NUCLEAR GASTRIC EMPTYING SCAN.



HISTORY: Epigastric pain and bloating. Comparison gastrectomy.



COMPARISON: None.



TECHNIQUE: Serial static images were obtained over the stomach following oral administration of 1.9 m
Ci of 99m-Tc sulfur colloid in a solid meal.



FINDINGS: The stomach appears normal in contour. There is clearance of activity into the small bowel.
 The remaining fraction of gastric activity is as follows.



1 hour 61% (normal range 34.8-91%)

2 hour 19% (normal range 2.7-60%)

3 hour 0% (normal range 0.5-28%)



IMPRESSION: 

1. Normal gastric emptying.



Electronically signed by: GUERA Telles MD (8/9/2021 12:19 PM) [id number]

## 2021-08-25 ENCOUNTER — HOSPITAL ENCOUNTER (OUTPATIENT)
Dept: HOSPITAL 63 - RAD | Age: 51
End: 2021-08-25
Payer: OTHER GOVERNMENT

## 2021-08-25 DIAGNOSIS — R19.7: Primary | ICD-10-CM

## 2021-08-25 DIAGNOSIS — Z90.49: ICD-10-CM

## 2021-08-25 PROCEDURE — 74250 X-RAY XM SM INT 1CNTRST STD: CPT

## 2021-08-26 NOTE — RAD
SMALL BOWEL SERIES 8/26/2021.



Reason for study: Abdominal pain, diarrhea.



Comparison studies: None..



Technique: Preliminary  film of the abdomen was obtained. Then following ingestion of oral bariu
m, serial images of the abdomen were obtained to assess progress of contrast throughout the small bow
el. 



Findings: Cholecystectomy changes are present. Transit time through the small bowel is normal. No jose
dence for bowel obstruction or dilatation. Jejunal and ileal fold patterns are normal with no evidenc
e for inflammatory bowel disease. The terminal ileum appears normal.



IMPRESSION:



No suspicious mucosal abnormality. No bowel obstruction.



Electronically signed by: Maggie Rod MD (8/26/2021 2:11 PM) [id number]

## 2021-10-09 ENCOUNTER — HOSPITAL ENCOUNTER (EMERGENCY)
Dept: HOSPITAL 63 - ER | Age: 51
Discharge: HOME | End: 2021-10-09
Payer: OTHER GOVERNMENT

## 2021-10-09 VITALS
SYSTOLIC BLOOD PRESSURE: 124 MMHG | DIASTOLIC BLOOD PRESSURE: 75 MMHG | DIASTOLIC BLOOD PRESSURE: 75 MMHG | SYSTOLIC BLOOD PRESSURE: 124 MMHG

## 2021-10-09 VITALS — WEIGHT: 177.91 LBS | HEIGHT: 65 IN | BODY MASS INDEX: 29.64 KG/M2
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
## 2021-10-09 DIAGNOSIS — Z88.1: ICD-10-CM
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
## 2021-10-09 DIAGNOSIS — S82.65XA: Primary | ICD-10-CM
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
## 2021-10-09 PROCEDURE — 96372 THER/PROPH/DIAG INJ SC/IM: CPT

## 2021-10-09 PROCEDURE — 73610 X-RAY EXAM OF ANKLE: CPT

## 2021-10-09 PROCEDURE — 73590 X-RAY EXAM OF LOWER LEG: CPT

## 2021-10-09 PROCEDURE — 99284 EMERGENCY DEPT VISIT MOD MDM: CPT

## 2021-10-09 NOTE — PHYS DOC
Past History


Past Medical History:  Cancer, Depression, Diverticulitis, Other


Additional Past Medical Histor:  colonic abscess with adhesions


 (AKILA CABRERA)


Past Surgical History:  Cholecystectomy, Other


Additional Past Surgical Histo:  ileostomy, ileostomy reversal, HERNIA REPAIR


 (AKILA CABRERA)


Smoking:  Non-smoker


Alcohol Use:  Occasionally


Drug Use:  None


 (AKILA CABRERA)





General Adult


EDM:


Chief Complaint:  ANKLE PROBLEM





HPI:


HPI:





Patient is a 51-year-old female who presents with left ankle pain.  Patient 

states that yesterday she tripped and fell down the stairs.  Unable to bear 

weight.  Patient states she has been applying ice and taking ibuprofen at home 

with no relief.


 (AKILA CABRERA)





Review of Systems:


Review of Systems:


ROS


At least 10 ROS systems have been reviewed and are negative except as documented

in the HPI.


General: Negative except as outlined in HPI above.


Skin: Negative except as outlined in HPI above.


HEENT: Negative except as outlined in HPI above.


Neck: Negative except as outlined in HPI above.


Respiratory: Negative except as outlined in HPI above..


Cardiovascular: Negative except as outlined in HPI above.


Abdomen: Negative except as outlined in HPI above.


: Negative except as outlined in HPI above.


Back/MSK: Negative except as outlined in HPI above.


Neuro: Negative except as outlined in HPI above.


Psych: Negative except as outlined in HPI above.


 (AKILA CABRERA)





Current Medications:


Current Meds:





Current Medications








 Medications


  (Trade)  Dose


 Ordered  Sig/Blaine  Start Time


 Stop Time Status Last Admin


Dose Admin


 


 Acetaminophen/


 Hydrocodone Bitart


  (Lortab 5/325)  1 tab  STK-MED ONCE  10/9/21 19:46


 10/9/21 19:47 DC  





 


 Morphine Sulfate


  (Morphine 4mg


 Syringe)  4 mg  STK-MED ONCE  10/9/21 19:49


 10/9/21 19:50 DC  











 (AKILA CABRERA)





Allergies:


Allergies:





Allergies








Coded Allergies Type Severity Reaction Last Updated Verified


 


  cephalexin Allergy Intermediate unknown 10/9/21 Yes








 (AKILA CABRERA)





Physical Exam:


PE:





Constitutional: Well developed, well nourished, no acute distress, non-toxic 

appearance. []


HENT: Normocephalic, atraumatic, bilateral external ears normal, oropharynx 

moist, no oral exudates, nose normal. []


Eyes: PERRLA, EOMI, conjunctiva normal, no discharge. [] 


Neck: Normal range of motion, no tenderness, supple, no stridor. [] 


Cardiovascular:Heart rate regular rhythm, no murmur []


Lungs & Thorax:  Bilateral breath sounds clear to auscultation []


Abdomen: Bowel sounds normal, soft, no tenderness, no masses, no pulsatile 

masses. [] 


Skin: Warm, dry, no erythema, no rash. [] 


Back: No tenderness, no CVA tenderness. [] 


Extremities: Left ankle tenderness, no cyanosis, no clubbing, ROM intact, 

swelling


Neurologic: Alert and oriented X 3, normal motor function, normal sensory 

function, no focal deficits noted. []


Psychologic: Affect normal, judgement normal, mood normal. []


 (AKILA CABRERA)





Current Patient Data:


Vital Signs:





                                   Vital Signs








  Date Time  Temp Pulse Resp B/P (MAP) Pulse Ox O2 Delivery O2 Flow Rate FiO2


 


10/9/21 20:00   18     


 


10/9/21 19:30 98.0 75  122/72 (89) 97 Room Air  








 (AKILA CABRERA)





EKG:


EKG:


[]


 (AKILA CABRERA)





Radiology/Procedures:


Radiology/Procedures:


[]Study:


1. XR LT TIBIA + FIBULA


2. XR EXAM OF ANKLE_LEFT 3V





Indication: Ankle injury. Pain.





Comparison: None.





Findings:





Left tibia/fibula:





The proximal tibia and fibula are intact. No malalignment at the partially 

assessed knee or femorotibial compartment joint space collapse.





Left ankle:





Acute, horizontally oriented fracture of the distal fibula occurring below the 

ankle joint space. No displacement. Acute avulsion fracture also seen at the 

lateral hindfoot. The ankle mortise is symmetric considering the absence of 

weightbearing.





Impression:





Left tibia/fibula and left ankle:


1. Acute nondisplaced fracture of the lateral malleolus occurring below the 

joint line (Francis type A).


2. Small acute avulsion fracture at the lateral hindfoot only seen on one view 

but favored to originate from the distal margin of the calcaneus.


3. No acute fracture of the more proximal tibia/fibula or at the partially 

assessed knee.





Electronically signed by: KAL SHER MD (10/9/2021 9:02 PM) [facility]





 (AKILA CABRERA)





Heart Score:


C/O Chest Pain:  No


Risk Factors:


Risk Factors:  DM, Current or recent (<one month) smoker, HTN, HLP, family 

history of CAD, obesity.


Risk Scores:


Score 0 - 3:  2.5% MACE over next 6 weeks - Discharge Home


Score 4 - 6:  20.3% MACE over next 6 weeks - Admit for Clinical Observation


Score 7 - 10:  72.7% MACE over next 6 weeks - Early Invasive Strategies


 (AKILA CABRERA)





Course & Med Decision Making:


Course & Med Decision Making


Pertinent Labs and Imaging studies reviewed. (See chart for details)





[] 51-year-old female presents with left ankle pain after tripping and falling 

down the stairs yesterday.  Unable to bear weight.  Pedal pulses are intact.


4 mg IM morphine given for pain.  Left ankle x-ray ordered to rule out fracture.


Ankle x-ray shows acute nondisplaced fracture of the lateral malleolus occurring

 below the joint line (Francis type A).


Short leg posterior splint placed and crutches given.  Referral information for 

orthopedics .  Rice instructions.  Patient sent home with hydrocodone.  Patient 

should follow-up with Ortho in 5 to 7 days.  Return precautions discussed.


 (AKILA CABRERA)


Course & Med Decision Making


Did not see or evaluate patient.  Did not discuss patient with NP.  Agree with 

NP's work-up and disposition per note.


 (JERAMIE TAVARES MD)


Dragon Disclaimer:


Dragon Disclaimer:


This electronic medical record was generated, in whole or in part, using a voice

 recognition dictation system.


 (AKILA CABRERA)





Departure


Departure:


Impression:  


   Primary Impression:  


   Fracture, ankle


   Qualified Codes:  S82.891A - Other fracture of right lower leg, initial 

   encounter for closed fracture


Disposition:  01 HOME / SELF CARE / HOMELESS


Condition:  STABLE


Referrals:  


ASHLEE BOWEN (PCP)


Patient Instructions:  RICE - Routine Care for Injuries, Easy-to-Read





Additional Instructions:  


You need to call Ortho in 5 to 7 days for a follow-up appointment.  Rest, use 

ice, elevate to help with swelling and pain.  Sending home with prescription for

 hydrocodone, use Motrin also for breakthrough pain.  Return if you have 

worsening symptoms or concerns.





[facility]


[address]


[place] 


[phone]





EMERGENCY DEPARTMENT GENERAL DISCHARGE INSTRUCTIONS





Thank you for coming to [facility] (ED) today and trusting us

 with you 


care.  We trust that you had a positivie experience in our Emergency Department.

  If you 


wish to speak to the department management, you may call the director at 

[phone].





YOUR FOLLOW UP INSTRUCTIONS ARE AS FOLLOWS:





1.  Do you have a private Doctor?  If you do not have a private doctor, please 

ask for a 


resource list of physicians or clinics that may be able to assist you with 

follow up care.





2.  The Emergency Physician has interpreted your x-rays.  The X-Ray specialist 

will also 


review them.  If there is a change in the findings, you will be notified in 48 

hours when at 


all possible.





3.  A lab test or culture has been done, your results will be reviewed and you 

will be 


notified if you need a change in treatment.





ADDITIONAL INSTRUCTIONS AND INFORMATION:





1.  Your care today has been supervised by a physician who is specially trained 

in emergency 


care.  Many problems require more than one evaluation for a complete diagnosis 

and 


treatment.  We recommend that you schedule your follow up appointment as 

recommended to 


ensure complete treatment of you illness or injury.  If you are unable to obtain

 follow up 


care and continue to have a problem, or if your condition worsens, we recommend 

that you 


return to the ED.





2.  We are not able to safely determine your condition over the phone nor are we

 able to 


give sound medical advice over the phone.  For these safety reasons, if you call

 for medical 


advice we will ask you to come to the ED for further evaluation.





3.  If you have any questions regarding these discharge instructions please call

 the ED at 


[phone].





SAFETY INFORMATION:





In the interest of safety, wellness, and injury prevention; we encourage you to 

wear your 


sealbelt, if you smoke; quite smoking, and we encourage family to use a 

protective helmet 


for bicycling and other sporting events that present an increased risk for head 

injury.





IF YOUR SYMPTOMS WORSEN OR NEW SYMPTOMS DEVELOP, OR YOU HAVE CONCERNS ABOUT YOUR

 CONDITION; 


OR IF YOUR CONDITION WORSENS WHILE YOU ARE WAITING FOR YOUR FOLLOW UP 

APPOINTMENT; EITHER 


CONTACT YOUR PRIMARY CARE DOCTOR, THE PHYSICIAN WHOSE NAME AND NUMBER YOU WERE 

GIVEN, OR 


RETURN TO THE ED IMMEDIATELY.


Scripts


Hydrocodone/Acetaminophen (Hydrocodone-Acetamin 5-325 mg) 1 Each Tablet


1-2 TAB PO Q4-6HRS PRN for PAIN for 7 Days, #30 TAB


   Prov: AKILA CABRERA         10/9/21











AKILA CABRERA                Oct 9, 2021 20:12


JERAMIE TAVARES MD                Oct 9, 2021 22:30

## 2021-10-09 NOTE — RAD
Study:

1. XR LT TIBIA + FIBULA

2. XR EXAM OF ANKLE_LEFT 3V



Indication: Ankle injury. Pain.



Comparison: None.



Findings:



Left tibia/fibula:



The proximal tibia and fibula are intact. No malalignment at the partially assessed knee or femorotib
ial compartment joint space collapse.



Left ankle:



Acute, horizontally oriented fracture of the distal fibula occurring below the ankle joint space. No 
displacement. Acute avulsion fracture also seen at the lateral hindfoot. The ankle mortise is symmetr
ic considering the absence of weightbearing.



Impression:



Left tibia/fibula and left ankle:

1. Acute nondisplaced fracture of the lateral malleolus occurring below the joint line (Francis type A)
.

2. Small acute avulsion fracture at the lateral hindfoot only seen on one view but favored to origina
te from the distal margin of the calcaneus.

3. No acute fracture of the more proximal tibia/fibula or at the partially assessed knee.



Electronically signed by: KAL SHER MD (10/9/2021 9:02 PM) [facility]

## 2021-10-09 NOTE — RAD
Study:

1. XR LT TIBIA + FIBULA

2. XR EXAM OF ANKLE_LEFT 3V



Indication: Ankle injury. Pain.



Comparison: None.



Findings:



Left tibia/fibula:



The proximal tibia and fibula are intact. No malalignment at the partially assessed knee or femorotib
ial compartment joint space collapse.



Left ankle:



Acute, horizontally oriented fracture of the distal fibula occurring below the ankle joint space. No 
displacement. Acute avulsion fracture also seen at the lateral hindfoot. The ankle mortise is symmetr
ic considering the absence of weightbearing.



Impression:



Left tibia/fibula and left ankle:

1. Acute nondisplaced fracture of the lateral malleolus occurring below the joint line (Francis type A)
.

2. Small acute avulsion fracture at the lateral hindfoot only seen on one view but favored to origina
te from the distal margin of the calcaneus.

3. No acute fracture of the more proximal tibia/fibula or at the partially assessed knee.



Electronically signed by: KAL SHER MD (10/9/2021 9:02 PM) [facility]

## 2021-11-19 ENCOUNTER — HOSPITAL ENCOUNTER (OUTPATIENT)
Dept: HOSPITAL 63 - RAD | Age: 51
End: 2021-11-19
Payer: OTHER GOVERNMENT

## 2021-11-19 ENCOUNTER — HOSPITAL ENCOUNTER (EMERGENCY)
Dept: HOSPITAL 63 - ER | Age: 51
Discharge: HOME | End: 2021-11-19
Payer: OTHER GOVERNMENT

## 2021-11-19 VITALS — WEIGHT: 182.1 LBS | HEIGHT: 65 IN | BODY MASS INDEX: 30.34 KG/M2

## 2021-11-19 VITALS — SYSTOLIC BLOOD PRESSURE: 109 MMHG | DIASTOLIC BLOOD PRESSURE: 70 MMHG

## 2021-11-19 DIAGNOSIS — S82.65XD: Primary | ICD-10-CM
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
## 2021-11-19 DIAGNOSIS — Z90.49: ICD-10-CM

## 2021-11-19 DIAGNOSIS — R10.30: Primary | ICD-10-CM

## 2021-11-19 DIAGNOSIS — Z90.710: ICD-10-CM

## 2021-11-19 LAB
ALBUMIN SERPL-MCNC: 3.9 G/DL (ref 3.4–5)
ALBUMIN/GLOB SERPL: 1.2 {RATIO} (ref 1–1.7)
ALP SERPL-CCNC: 94 U/L (ref 46–116)
ALT SERPL-CCNC: 13 U/L (ref 14–59)
ANION GAP SERPL CALC-SCNC: 9 MMOL/L (ref 6–14)
APTT PPP: YELLOW S
AST SERPL-CCNC: 15 U/L (ref 15–37)
BACTERIA #/AREA URNS HPF: 0 /HPF
BASOPHILS # BLD AUTO: 0 X10^3/UL (ref 0–0.2)
BASOPHILS NFR BLD: 1 % (ref 0–3)
BILIRUB SERPL-MCNC: 0.5 MG/DL (ref 0.2–1)
BILIRUB UR QL STRIP: (no result)
BUN/CREAT SERPL: 24 (ref 6–20)
CA-I SERPL ISE-MCNC: 19 MG/DL (ref 7–20)
CALCIUM SERPL-MCNC: 8.9 MG/DL (ref 8.5–10.1)
CHLORIDE SERPL-SCNC: 101 MMOL/L (ref 98–107)
CO2 SERPL-SCNC: 29 MMOL/L (ref 21–32)
CREAT SERPL-MCNC: 0.8 MG/DL (ref 0.6–1)
EOSINOPHIL NFR BLD: 0.1 X10^3/UL (ref 0–0.7)
EOSINOPHIL NFR BLD: 2 % (ref 0–3)
ERYTHROCYTE [DISTWIDTH] IN BLOOD BY AUTOMATED COUNT: 14.4 % (ref 11.5–14.5)
FECAL OB PT: NEGATIVE
FIBRINOGEN PPP-MCNC: CLEAR MG/DL
GFR SERPLBLD BASED ON 1.73 SQ M-ARVRAT: 75.3 ML/MIN
GLOBULIN SER-MCNC: 3.3 G/DL (ref 2.2–3.8)
GLUCOSE SERPL-MCNC: 99 MG/DL (ref 70–99)
GLUCOSE UR STRIP-MCNC: (no result) MG/DL
HCT VFR BLD CALC: 39.2 % (ref 36–47)
HGB BLD-MCNC: 13 G/DL (ref 12–15.5)
LIPASE: 52 U/L (ref 73–393)
LYMPHOCYTES # BLD: 2.1 X10^3/UL (ref 1–4.8)
LYMPHOCYTES NFR BLD AUTO: 39 % (ref 24–48)
MCH RBC QN AUTO: 29 PG (ref 25–35)
MCHC RBC AUTO-ENTMCNC: 33 G/DL (ref 31–37)
MCV RBC AUTO: 86 FL (ref 79–100)
MONO #: 0.5 X10^3/UL (ref 0–1.1)
MONOCYTES NFR BLD: 10 % (ref 0–9)
NEUT #: 2.6 X10^3UL (ref 1.8–7.7)
NEUTROPHILS NFR BLD AUTO: 48 % (ref 31–73)
NITRITE UR QL STRIP: (no result)
PLATELET # BLD AUTO: 234 X10^3/UL (ref 140–400)
POTASSIUM SERPL-SCNC: 4 MMOL/L (ref 3.5–5.1)
PROT SERPL-MCNC: 7.2 G/DL (ref 6.4–8.2)
RBC # BLD AUTO: 4.53 X10^6/UL (ref 3.5–5.4)
RBC #/AREA URNS HPF: (no result) /HPF (ref 0–2)
SODIUM SERPL-SCNC: 139 MMOL/L (ref 136–145)
SP GR UR STRIP: 1.01
SQUAMOUS #/AREA URNS LPF: (no result) /LPF
UROBILINOGEN UR-MCNC: 0.2 MG/DL
WBC # BLD AUTO: 5.4 X10^3/UL (ref 4–11)
WBC #/AREA URNS HPF: 0 /HPF (ref 0–4)

## 2021-11-19 PROCEDURE — 96374 THER/PROPH/DIAG INJ IV PUSH: CPT

## 2021-11-19 PROCEDURE — 73610 X-RAY EXAM OF ANKLE: CPT

## 2021-11-19 PROCEDURE — 36415 COLL VENOUS BLD VENIPUNCTURE: CPT

## 2021-11-19 PROCEDURE — 83690 ASSAY OF LIPASE: CPT

## 2021-11-19 PROCEDURE — 80053 COMPREHEN METABOLIC PANEL: CPT

## 2021-11-19 PROCEDURE — 85025 COMPLETE CBC W/AUTO DIFF WBC: CPT

## 2021-11-19 PROCEDURE — 82274 ASSAY TEST FOR BLOOD FECAL: CPT

## 2021-11-19 PROCEDURE — 99285 EMERGENCY DEPT VISIT HI MDM: CPT

## 2021-11-19 PROCEDURE — 74177 CT ABD & PELVIS W/CONTRAST: CPT

## 2021-11-19 PROCEDURE — 81001 URINALYSIS AUTO W/SCOPE: CPT

## 2021-11-19 PROCEDURE — 96375 TX/PRO/DX INJ NEW DRUG ADDON: CPT

## 2021-11-19 NOTE — RAD
EXAM: Left ankle, 3 views.



HISTORY: Fracture follow-up.



COMPARISON: 10/9/2021



FINDINGS: 3 views of the left ankle are obtained. There has been slight interval healing of a nondisp
laced lateral malleolar fracture. The fracture line is slightly less distinct compared to the prior e
xam. There is a tiny ossicle adjacent to the lateral hindfoot, likely due to an avulsion fracture. Th
ere has been no interval healing in location. The ankle mortise is intact. There is no osteochondral 
lesion.



IMPRESSION: 

1. Slight interval healing of a lateral wall fracture.

2. No change in a small displaced avulsion fracture fragment along the lateral hindfoot.



Electronically signed by: Denise Foley MD (11/19/2021 2:34 PM) [id number]

## 2021-11-19 NOTE — RAD
CT abdomen and pelvis with contrast



PQRS statement: CT scans at this facility use dose reduction including either automated exposure cont
rol, iterative reconstructions, and /or weight based radiation dosing via mA and kV modification when
 appropriate to reduce radiation dose to as low as reasonably achievable.



HISTORY: Abdominal pain. History of colon cancer with colectomy.



Contrast: 75 mL Omnipaque 350 intravenous contrast.



COMPARISON: CT abdomen and pelvis June 21, 2021



Abdomen findings: Lumbar disc bulges and disc height loss with spinal canal stenoses. Lung bases unre
markable. Mild dilation of the bile ducts likely related to cholecystectomy similar to prior imaging.
 1.5 cm left hepatic lobe cyst is stable. Kidneys, adrenals, pancreas, spleen unremarkable. Postopera
tive change of vertical sleeve partial gastrectomy. There is a new umbilical abdominal wall hernia co
ntaining small bowel loops is hernia sac measures 6 x 3 cm with a wide mouth, no obstruction or infla
mmation of the herniated segments of bowel. Rectosigmoid colectomy with colorectal anastomosis. Surgi
katt change at the ileocecal junction with anastomosis, appendix surgically absent. No bowel obstructi
on. There is luminal collapse and wall thickening of the cecum through the hepatic flexure which is n
ew. No abdominal fluid or adenopathy.



Pelvis findings: Hysterectomy. Ovaries absent or markedly atrophic. Bladder, lower rectum and bones a
re unremarkable. No pelvic fluid or adenopathy.



IMPRESSION:

1. Luminal collapse and wall thickening of the right-sided colon may represent colitis. No bowel obst
ruction.

2. Post surgical changes as scribed above.

3. There is a new 6 x 3 cm wide mouth umbilical ventral abdominal hernia containing small bowel loops
. There is no obstruction or inflammation of the herniated segments of bowel.

4. Other stable chronic findings as described above.



Electronically signed by: Keny Montero MD (11/19/2021 4:12 PM) [facility]

## 2021-11-19 NOTE — PHYS DOC
Past History


Past Medical History:  Cancer, Depression, Diverticulitis, Other


Additional Past Medical Histor:  colonic abscess with adhesions ; ADHD


 (BETHANY REYES)


Past Surgical History:  Appendectomy, Cholecystectomy, Colectomy, Hysterectomy, 

Oophorectomy, Other


Additional Past Surgical Histo:  ileostomy, ileostomy reversal, HERNIA REPAIR; 

thyroidectomy; bladder


 (BETHANY REYES APRN)


Smoking:  Non-smoker


Alcohol Use:  Rarely


Drug Use:  None


 (BETHANY REYES)





General Adult


EDM:


Chief Complaint:  ABDOMINAL PAIN





HPI:


HPI:


Patient is a 51-year-old female that presents today with lower abdominal pain.  

Patient states over the last 2 years she has had multiple abdominal surgeries 

which include total hysterectomy, colon resection due to abdominal abscess and 

colon rupture.  Patient presents today with abdominal pain in the lower 

quadrants that started yesterday patient states her stools have been black in 

nature, she states she had similar symptoms about a year ago and was diagnosed 

with a gastrointestinal bleed.  Patient denies nausea vomiting, shortness of 

air, or chest pain


 (BETHANY REYES)





Review of Systems:


Review of Systems:


Constitutional:  Denies fever or chills 


Eyes:  Denies change in visual acuity 


HENT:  Denies nasal congestion or sore throat 


Respiratory:  Denies cough or shortness of breath 


Cardiovascular:  Denies chest pain or edema 


GI:  abdominal pain and black stools


: Denies dysuria 


Musculoskeletal:  Denies back pain or joint pain 


Integument:  Denies rash 


Neurologic:  Denies headache, focal weakness or sensory changes 


Endocrine:  Denies polyuria or polydipsia 


Lymphatic:  Denies swollen glands 


Psychiatric:  Denies depression or anxiety


 (BETHANY REYES)





Current Medications:


Current Meds:





Current Medications








 Medications


  (Trade)  Dose


 Ordered  Sig/Blaine  Start Time


 Stop Time Status Last Admin


Dose Admin


 


 Fentanyl Citrate


  (Fentanyl 2ml


 Vial)  100 mcg  STK-MED ONCE  11/19/21 15:24


 11/19/21 15:25 DC  





 


 Info


  (Do NOT chart on


 this entry -- for


 MONITORING)  1 each  PRN DAILY  PRN  11/19/21 15:30


 11/21/21 15:29   





 


 Iohexol


  (Omnipaque 350


 Mg/ml)  100 ml  1X  ONCE  11/19/21 15:30


 11/19/21 15:31 DC 11/19/21 15:50


100 ML


 


 Ondansetron HCl


  (Zofran)  4 mg  STK-MED ONCE  11/19/21 15:24


 11/19/21 15:24 DC  











 (BETHANY REYES)





Allergies:


Allergies:





Allergies








Coded Allergies Type Severity Reaction Last Updated Verified


 


  cephalexin Allergy Intermediate  11/19/21 Yes








 (BETHANY REYES)





Physical Exam:


PE:





Constitutional: Well developed, well nourished, no acute distress, non-toxic 

appearance. []


HENT: Normocephalic, atraumatic, bilateral external ears normal, oropharynx 

moist, no oral exudates, nose normal. []


Eyes: PERRLA, EOMI, conjunctiva normal, no discharge. [] 


Neck: Normal range of motion, no tenderness, supple, no stridor. [] 


Cardiovascular:Heart rate regular rhythm, no murmur []


Lungs & Thorax:  Bilateral breath sounds clear to auscultation []


Abdomen: Bowel sounds hypoactive, tenderness with palpation to the lower 

abdomen, multiple abdominal incisions noted that are well-healed with no signs 

and symptoms of infection noted, 


Skin: Warm, dry, no erythema, no rash. [] 


Back: No tenderness, no CVA tenderness. [] 


Extremities: No tenderness, no cyanosis, no clubbing, ROM intact, no edema. [] 


Neurologic: Alert and oriented X 3, normal motor function, normal sensory 

function, no focal deficits noted. []


Psychologic: Affect normal, judgement normal, mood normal. []


 (BETHANY REYES)





Current Patient Data:


Labs:





Laboratory Tests








Test


 11/19/21


15:34 11/19/21


16:10


 


White Blood Count 5.4 x10^3/uL  


 


Red Blood Count 4.53 x10^6/uL  


 


Hemoglobin 13.0 g/dL  


 


Hematocrit 39.2 %  


 


Mean Corpuscular Volume 86 fL  


 


Mean Corpuscular Hemoglobin 29 pg  


 


Mean Corpuscular Hemoglobin


Concent 33 g/dL 


 





 


Red Cell Distribution Width 14.4 %  


 


Platelet Count 234 x10^3/uL  


 


Neutrophils (%) (Auto) 48 %  


 


Lymphocytes (%) (Auto) 39 %  


 


Monocytes (%) (Auto) 10 %  


 


Eosinophils (%) (Auto) 2 %  


 


Basophils (%) (Auto) 1 %  


 


Neutrophils # (Auto) 2.6 x10^3uL  


 


Lymphocytes # (Auto) 2.1 x10^3/uL  


 


Monocytes # (Auto) 0.5 x10^3/uL  


 


Eosinophils # (Auto) 0.1 x10^3/uL  


 


Basophils # (Auto) 0.0 x10^3/uL  


 


Sodium Level 139 mmol/L  


 


Potassium Level 4.0 mmol/L  


 


Chloride Level 101 mmol/L  


 


Carbon Dioxide Level 29 mmol/L  


 


Anion Gap 9  


 


Blood Urea Nitrogen 19 mg/dL  


 


Creatinine 0.8 mg/dL  


 


Estimated GFR


(Cockcroft-Gault) 75.3 


 





 


BUN/Creatinine Ratio 24  


 


Glucose Level 99 mg/dL  


 


Calcium Level 8.9 mg/dL  


 


Total Bilirubin 0.5 mg/dL  


 


Aspartate Amino Transf


(AST/SGOT) 15 U/L 


 





 


Alanine Aminotransferase


(ALT/SGPT) 13 U/L 


 





 


Alkaline Phosphatase 94 U/L  


 


Total Protein 7.2 g/dL  


 


Albumin 3.9 g/dL  


 


Albumin/Globulin Ratio 1.2  


 


Lipase 52 U/L  


 


Urine Collection Type  Clean catch 


 


Urine Color  Yellow 


 


Urine Clarity  Clear 


 


Urine pH  5.0 


 


Urine Specific Gravity  1.015 


 


Urine Protein  Neg 


 


Urine Glucose (UA)  Neg mg/dL 


 


Urine Ketones (Stick)  Trace mg/dL 


 


Urine Blood  Trace 


 


Urine Nitrite  Neg 


 


Urine Bilirubin  Neg 


 


Urine Urobilinogen Dipstick  0.2 mg/dL 


 


Urine Leukocyte Esterase  Neg 


 


Urine RBC  Occ /HPF 


 


Urine WBC  0 /HPF 


 


Urine Squamous Epithelial


Cells 


 Few /LPF 





 


Urine Bacteria  0 /HPF 


 


Urine Mucus  Mod /LPF 








Current Medications








 Medications


  (Trade)  Dose


 Ordered  Sig/Blaine


 Route


 PRN Reason  Start Time


 Stop Time Status Last Admin


Dose Admin


 


 Fentanyl Citrate


  (Fentanyl 2ml


 Vial)  50 mcg  1X  ONCE


 IVP


   11/19/21 15:30


 11/19/21 15:31 DC 11/19/21 15:38





 


 Ondansetron HCl


  (Zofran)  4 mg  1X  ONCE


 IVP


   11/19/21 15:30


 11/19/21 15:31 DC 11/19/21 15:37





 


 Ondansetron HCl


  (Zofran)  4 mg  STK-MED ONCE


 .ROUTE


   11/19/21 15:24


 11/19/21 15:24 DC  





 


 Fentanyl Citrate


  (Fentanyl 2ml


 Vial)  100 mcg  STK-MED ONCE


 .ROUTE


   11/19/21 15:24


 11/19/21 15:25 DC  





 


 Iohexol


  (Omnipaque 350


 Mg/ml)  100 ml  1X  ONCE


 IV


   11/19/21 15:30


 11/19/21 15:31 DC 11/19/21 15:50





 


 Info


  (Do NOT chart on


 this entry -- for


 MONITORING)  1 each  PRN DAILY  PRN


 [facility]


 SEE COMMENTS  11/19/21 15:30


 11/21/21 15:29   





 


 Ketorolac


 Tromethamine


  (Toradol 30mg


 Vial)  30 mg  1X  ONCE


 IVP


   11/19/21 16:15


 11/19/21 16:17 DC 11/19/21 16:22











Vital Signs:








Vital Signs








  Date Time  Temp Pulse Resp B/P (MAP) Pulse Ox O2 Delivery O2 Flow Rate FiO2


 


11/19/21 18:38  72 18 109/70 (83) 98   


 


11/19/21 17:37  72 19 109/70 (83) 98   


 


11/19/21 17:07  71 15 122/83 (96) 100   


 


11/19/21 16:24  80 23 115/69 (84) 97 Room Air  


 


11/19/21 16:08   18  98 Room Air  


 


11/19/21 15:55  96 17 120/83 (95) 98   


 


11/19/21 15:38   24  98 Room Air  


 


11/19/21 15:34  84 24 111/64 (80) 97   


 


11/19/21 15:01 98.5 90 24 126/81 (96) 97 Room Air  








                                   Vital Signs








  Date Time  Temp Pulse Resp B/P (MAP) Pulse Ox O2 Delivery O2 Flow Rate FiO2


 


11/19/21 15:38   24  98 Room Air  


 


11/19/21 15:01 98.5 90  126/81 (96)    








 (BETHANY REYES APRKEVIN)





EKG:


EKG:


[]


 (BETHANY REYES APRKEVIN)





Radiology/Procedures:


Radiology/Procedures:


REASON: abd pain. hx of colon cancer with colon resection 75mls omni 350


PROCEDURE: CT ABD PELV W/ IV CONTRST ONLY





CT abdomen and pelvis with contrast





PQRS statement: CT scans at this facility use dose reduction including either 

automated exposure control, iterative reconstructions, and /or weight based 

radiation dosing via mA and kV modification when appropriate to reduce radiation

 dose to as low as reasonably achievable.





HISTORY: Abdominal pain. History of colon cancer with colectomy.





Contrast: 75 mL Omnipaque 350 intravenous contrast.





COMPARISON: CT abdomen and pelvis June 21, 2021





Abdomen findings: Lumbar disc bulges and disc height loss with spinal canal 

stenoses. Lung bases unremarkable. Mild dilation of the bile ducts likely 

related to cholecystectomy similar to prior imaging. 1.5 cm left hepatic lobe 

cyst is stable. Kidneys, adrenals, pancreas, spleen unremarkable. Postoperative 

change of vertical sleeve partial gastrectomy. There is a new umbilical 

abdominal wall hernia containing small bowel loops is hernia sac measures 6 x 3 

cm with a wide mouth, no obstruction or inflammation of the herniated segments 

of bowel. Rectosigmoid colectomy with colorectal anastomosis. Surgical change at

 the ileocecal junction with anastomosis, appendix surgically absent. No bowel 

obstruction. There is luminal collapse and wall thickening of the cecum through 

the hepatic flexure which is new. No abdominal fluid or adenopathy.





Pelvis findings: Hysterectomy. Ovaries absent or markedly atrophic. Bladder, 

lower rectum and bones are unremarkable. No pelvic fluid or adenopathy.





IMPRESSION:


1. Luminal collapse and wall thickening of the right-sided colon may represent 

colitis. No bowel obstruction.


2. Post surgical changes as scribed above.


3. There is a new 6 x 3 cm wide mouth umbilical ventral abdominal hernia 

containing small bowel loops. There is no obstruction or inflammation of the 

herniated segments of bowel.


4. Other stable chronic findings as described above.





Electronically signed by: Miriam Montero MD (11/19/2021 4:12 PM) [facility] [place]














DICTATED AND SIGNED BY:     MIRIAM MONTERO MD


DATE:     11/19/21 1605





CC: BETHANY REYES; ASHLEE BOWEN ~MTH0 0[]


 (BETHANY REYES)





Heart Score:


C/O Chest Pain:  N/A


Risk Factors:


Risk Factors:  DM, Current or recent (<one month) smoker, HTN, HLP, family 

history of CAD, obesity.


Risk Scores:


Score 0 - 3:  2.5% MACE over next 6 weeks - Discharge Home


Score 4 - 6:  20.3% MACE over next 6 weeks - Admit for Clinical Observation


Score 7 - 10:  72.7% MACE over next 6 weeks - Early Invasive Strategies


 (BETHANY REYES)





Course & Med Decision Making:


Course & Med Decision Making


Pertinent Labs and Imaging studies reviewed. (See chart for details)





1800 spoke to patient and significant other at the bedside informed patient of 

lab results and radiological results.  Discussed patient going home with 

negative results patient is agreeable to going home and following up with Dr. Castellanos her GI specialist on Monday.  Return to the emergency department for 

increased abdominal pain, nausea vomiting or diarrhea, inability to keep fluids 

down, fever or chills, or unable to keep medications down.


 (BETHANY REYES)





Dragon Disclaimer:


Dragon Disclaimer:


This electronic medical record was generated, in whole or in part, using a voice

 recognition dictation system.


 (BETHANY REYES)


Attending Co-Sign


The patient was seen and interviewed as well as examined at the bedside. The 

chart was reviewed. The case was discussed. Agree with the plan of care.


 (FRANSICO TENA DO)





Departure


Departure:


Impression:  


   Primary Impression:  


   Abdominal pain


   Qualified Codes:  R10.30 - Lower abdominal pain, unspecified


Disposition:  01 HOME / SELF CARE / HOMELESS


Condition:  STABLE


Referrals:  


ASHLEE BOWEN (PCP)


Patient Instructions:  Abdominal Pain





Additional Instructions:  


Return to the emergency department for increased pain, blood in your stool, 

increased nausea vomiting diarrhea, or fever or chills


Hydrocodone 1 tablet every 6 hours as needed for severe pain


Continue current medications as prescribed


Scripts


Hydrocodone Bit/Acetaminophen (HYDROCODONE-APAP 5-325  **) 1 Each Tablet


1 TAB PO PRN Q6HRS PRN for PAIN, #10 TAB 0 Refills


   Prov: BETHANY REYES         11/19/21











BETHANY REYES       Nov 19, 2021 15:57


FRANSICO TENA DO                 Nov 20, 2021 06:55

## 2021-11-30 ENCOUNTER — HOSPITAL ENCOUNTER (INPATIENT)
Dept: HOSPITAL 61 - ER | Age: 51
LOS: 2 days | Discharge: HOME | DRG: 392 | End: 2021-12-02
Payer: OTHER GOVERNMENT

## 2021-11-30 VITALS — HEIGHT: 70 IN | BODY MASS INDEX: 26.13 KG/M2 | WEIGHT: 182.54 LBS

## 2021-11-30 VITALS — SYSTOLIC BLOOD PRESSURE: 117 MMHG | DIASTOLIC BLOOD PRESSURE: 60 MMHG

## 2021-11-30 VITALS — DIASTOLIC BLOOD PRESSURE: 70 MMHG | SYSTOLIC BLOOD PRESSURE: 119 MMHG

## 2021-11-30 DIAGNOSIS — F41.9: ICD-10-CM

## 2021-11-30 DIAGNOSIS — Z98.84: ICD-10-CM

## 2021-11-30 DIAGNOSIS — J30.9: ICD-10-CM

## 2021-11-30 DIAGNOSIS — Z90.49: ICD-10-CM

## 2021-11-30 DIAGNOSIS — Z20.822: ICD-10-CM

## 2021-11-30 DIAGNOSIS — G89.29: ICD-10-CM

## 2021-11-30 DIAGNOSIS — K52.9: Primary | ICD-10-CM

## 2021-11-30 DIAGNOSIS — F32.A: ICD-10-CM

## 2021-11-30 DIAGNOSIS — Z87.19: ICD-10-CM

## 2021-11-30 DIAGNOSIS — E78.5: ICD-10-CM

## 2021-11-30 DIAGNOSIS — Z87.891: ICD-10-CM

## 2021-11-30 DIAGNOSIS — Z90.710: ICD-10-CM

## 2021-11-30 DIAGNOSIS — Z85.43: ICD-10-CM

## 2021-11-30 DIAGNOSIS — Z80.0: ICD-10-CM

## 2021-11-30 DIAGNOSIS — Z85.42: ICD-10-CM

## 2021-11-30 DIAGNOSIS — K43.9: ICD-10-CM

## 2021-11-30 LAB
ALBUMIN SERPL-MCNC: 3.8 G/DL (ref 3.4–5)
ALBUMIN/GLOB SERPL: 1 {RATIO} (ref 1–1.7)
ALP SERPL-CCNC: 94 U/L (ref 46–116)
ALT SERPL-CCNC: 23 U/L (ref 14–59)
AMPHETAMINE/METHAMPHETAMINE: (no result)
ANION GAP SERPL CALC-SCNC: 8 MMOL/L (ref 6–14)
APTT PPP: YELLOW S
AST SERPL-CCNC: 17 U/L (ref 15–37)
BACTERIA #/AREA URNS HPF: 0 /HPF
BARBITURATES UR-MCNC: (no result) UG/ML
BASOPHILS # BLD AUTO: 0 X10^3/UL (ref 0–0.2)
BASOPHILS NFR BLD: 1 % (ref 0–3)
BENZODIAZ UR-MCNC: (no result) UG/L
BILIRUB SERPL-MCNC: 0.5 MG/DL (ref 0.2–1)
BILIRUB UR QL STRIP: NEGATIVE
BUN SERPL-MCNC: 20 MG/DL (ref 7–20)
BUN/CREAT SERPL: 22 (ref 6–20)
CALCIUM SERPL-MCNC: 8.9 MG/DL (ref 8.5–10.1)
CANNABINOIDS UR-MCNC: (no result) UG/L
CHLORIDE SERPL-SCNC: 102 MMOL/L (ref 98–107)
CO2 SERPL-SCNC: 29 MMOL/L (ref 21–32)
COCAINE UR-MCNC: (no result) NG/ML
CREAT SERPL-MCNC: 0.9 MG/DL (ref 0.6–1)
EOSINOPHIL NFR BLD: 0.1 X10^3/UL (ref 0–0.7)
EOSINOPHIL NFR BLD: 2 % (ref 0–3)
ERYTHROCYTE [DISTWIDTH] IN BLOOD BY AUTOMATED COUNT: 14.5 % (ref 11.5–14.5)
FIBRINOGEN PPP-MCNC: CLEAR MG/DL
GFR SERPLBLD BASED ON 1.73 SQ M-ARVRAT: 65.8 ML/MIN
GLUCOSE SERPL-MCNC: 94 MG/DL (ref 70–99)
HCT VFR BLD CALC: 41.7 % (ref 36–47)
HGB BLD-MCNC: 13.9 G/DL (ref 12–15.5)
LIPASE: 91 U/L (ref 73–393)
LYMPHOCYTES # BLD: 1.6 X10^3/UL (ref 1–4.8)
LYMPHOCYTES NFR BLD AUTO: 28 % (ref 24–48)
MAGNESIUM SERPL-MCNC: 2.1 MG/DL (ref 1.8–2.4)
MCH RBC QN AUTO: 29 PG (ref 25–35)
MCHC RBC AUTO-ENTMCNC: 33 G/DL (ref 31–37)
MCV RBC AUTO: 86 FL (ref 79–100)
METHADONE SERPL-MCNC: (no result) NG/ML
MONO #: 0.4 X10^3/UL (ref 0–1.1)
MONOCYTES NFR BLD: 7 % (ref 0–9)
NEUT #: 3.5 X10^3/UL (ref 1.8–7.7)
NEUTROPHILS NFR BLD AUTO: 63 % (ref 31–73)
NITRITE UR QL STRIP: NEGATIVE
OPIATES UR-MCNC: (no result) NG/ML
PCP SERPL-MCNC: (no result) MG/DL
PH UR STRIP: 5.5 [PH]
PLATELET # BLD AUTO: 255 X10^3/UL (ref 140–400)
POTASSIUM SERPL-SCNC: 3.7 MMOL/L (ref 3.5–5.1)
PROT SERPL-MCNC: 7.5 G/DL (ref 6.4–8.2)
PROT UR STRIP-MCNC: NEGATIVE MG/DL
RBC # BLD AUTO: 4.87 X10^6/UL (ref 3.5–5.4)
RBC #/AREA URNS HPF: (no result) /HPF (ref 0–2)
SODIUM SERPL-SCNC: 139 MMOL/L (ref 136–145)
UROBILINOGEN UR-MCNC: 0.2 MG/DL
WBC # BLD AUTO: 5.6 X10^3/UL (ref 4–11)
WBC #/AREA URNS HPF: (no result) /HPF (ref 0–4)

## 2021-11-30 PROCEDURE — 87040 BLOOD CULTURE FOR BACTERIA: CPT

## 2021-11-30 PROCEDURE — 81025 URINE PREGNANCY TEST: CPT

## 2021-11-30 PROCEDURE — G0378 HOSPITAL OBSERVATION PER HR: HCPCS

## 2021-11-30 PROCEDURE — 96361 HYDRATE IV INFUSION ADD-ON: CPT

## 2021-11-30 PROCEDURE — 36415 COLL VENOUS BLD VENIPUNCTURE: CPT

## 2021-11-30 PROCEDURE — 84145 PROCALCITONIN (PCT): CPT

## 2021-11-30 PROCEDURE — 96374 THER/PROPH/DIAG INJ IV PUSH: CPT

## 2021-11-30 PROCEDURE — 74177 CT ABD & PELVIS W/CONTRAST: CPT

## 2021-11-30 PROCEDURE — 81001 URINALYSIS AUTO W/SCOPE: CPT

## 2021-11-30 PROCEDURE — 85025 COMPLETE CBC W/AUTO DIFF WBC: CPT

## 2021-11-30 PROCEDURE — 83605 ASSAY OF LACTIC ACID: CPT

## 2021-11-30 PROCEDURE — 80053 COMPREHEN METABOLIC PANEL: CPT

## 2021-11-30 PROCEDURE — 87045 FECES CULTURE AEROBIC BACT: CPT

## 2021-11-30 PROCEDURE — 87205 SMEAR GRAM STAIN: CPT

## 2021-11-30 PROCEDURE — 83690 ASSAY OF LIPASE: CPT

## 2021-11-30 PROCEDURE — 83735 ASSAY OF MAGNESIUM: CPT

## 2021-11-30 PROCEDURE — 84443 ASSAY THYROID STIM HORMONE: CPT

## 2021-11-30 PROCEDURE — U0003 INFECTIOUS AGENT DETECTION BY NUCLEIC ACID (DNA OR RNA); SEVERE ACUTE RESPIRATORY SYNDROME CORONAVIRUS 2 (SARS-COV-2) (CORONAVIRUS DISEASE [COVID-19]), AMPLIFIED PROBE TECHNIQUE, MAKING USE OF HIGH THROUGHPUT TECHNOLOGIES AS DESCRIBED BY CMS-2020-01-R: HCPCS

## 2021-11-30 PROCEDURE — 87426 SARSCOV CORONAVIRUS AG IA: CPT

## 2021-11-30 PROCEDURE — 96375 TX/PRO/DX INJ NEW DRUG ADDON: CPT

## 2021-11-30 PROCEDURE — 87493 C DIFF AMPLIFIED PROBE: CPT

## 2021-11-30 PROCEDURE — 80307 DRUG TEST PRSMV CHEM ANLYZR: CPT

## 2021-11-30 RX ADMIN — MORPHINE SULFATE PRN MG: 4 INJECTION, SOLUTION INTRAMUSCULAR; INTRAVENOUS at 14:26

## 2021-11-30 RX ADMIN — MORPHINE SULFATE PRN MG: 4 INJECTION, SOLUTION INTRAMUSCULAR; INTRAVENOUS at 17:00

## 2021-11-30 RX ADMIN — ZOLPIDEM TARTRATE PRN MG: 5 TABLET ORAL at 22:05

## 2021-11-30 RX ADMIN — MORPHINE SULFATE PRN MG: 4 INJECTION, SOLUTION INTRAMUSCULAR; INTRAVENOUS at 14:31

## 2021-11-30 RX ADMIN — KETOROLAC TROMETHAMINE PRN MG: 30 INJECTION, SOLUTION INTRAMUSCULAR at 22:36

## 2021-11-30 RX ADMIN — FENTANYL CITRATE PRN MCG: 50 INJECTION INTRAMUSCULAR; INTRAVENOUS at 21:03

## 2021-11-30 NOTE — RAD
INDICATION: Reason: abd pain / Spl. Instructions: [id number]  [phone] / History: 



COMPARISON: November 19, 2021



TECHNIQUE: 



Axial CT images were obtained through the abdomen and pelvis with intravenous contrast.  



One or more of the following individualized dose reduction techniques were utilized for this examinat
ion:  1. Automated exposure control;  2. Adjustment of the mA and/or kV according to patient size;  3
. Use of iterative reconstruction technique.



FINDINGS:





Vascular: Scattered calcific atherosclerosis.

Hepatobiliary: Postcholecystectomy with prominence of the intrahepatic bile ducts which is a common f
inding postoperatively. Low-density lesion left lobe the liver again seen which is most likely cystic
 in nature.

Pancreas: No peripancreatic edema.

Spleen: Spleen unremarkable.

Renal/Bladder: Prominence of the bilateral renal pelvis again seen. Urinary bladder has minimal urine
 within it at time of exam.

Gastrointestinal: Postoperative changes to the colon with suture line seen in the sigmoid region as w
ell as right side of the colon. Colonic diverticulosis. Colon is largely decompressed with some regio
ns with prominence the wall. This includes at the right side of the colon with some subtle haziness t
o the fat again seen. No dilated loops of bowel to suggest obstruction.

Repeat demonstration of small anterior abdominal wall hernia which is similar to prior.

Degenerative changes the spine with multilevel central canal and neural foraminal stenosis.



IMPRESSION:



*   There is repeat demonstration of some prominence of the colon wall with small amount haziness the
 fat adjacent to a portion of the right side of the colon. A portion of the prominence the wall is li
eulogio secondary to lack of distention but given the mild haziness the fat this could be secondary to a
 small amount of residual inflammation from causes such as colitis if the patient has appropriate sym
ptoms. The haziness the fat appears slightly decreased from prior.



*  Postcholecystectomy with prominence of bile ducts which is a common finding postoperatively.



Electronically signed by: Deric Garcia MD (11/30/2021 3:31 PM) [id number]

## 2021-11-30 NOTE — PHYS DOC
Past Medical History


Past Medical History:  Depression


Additional Past Medical Histor:  HERNIA, LARS (LOW ANTERIOR RESECTION SYNDROME)


Past Surgical History:  Appendectomy, Cholecystectomy, Hysterectomy


Additional Past Surgical Histo:  MULTIPLE BOWEL RESECTION REPAIRS


Smoking Status:  Unknown if ever smoked


Alcohol Use:  None





General Adult


EDM:


Chief Complaint:  ABDOMINAL PAIN





HPI:


HPI:





Patient is a 51  year old female with a history of depression, multiple 

abdominal surgeries including colon resection, abdominal hernia, colitis, 

cholecystectomy, appendectomy, who presents the ED today complaining of chronic 

abdominal pain but has gotten worse this morning.  Patient describes the pain as

sharp and constant, rates the pain at 7 out of 10, states the pain is on the 

right lower quadrant.  Reports chronic diarrhea.  Denies any nausea or vomiting.

 She states she ran out of her oxycodone a couple days ago.  She states she is 

supposed to have ventral hernia repair surgery in 2 months with Dr. Lyman and 

can not wait until then.  She states she was seen at [facility] on 

November 19, 2021 but they did not do anything for her though she reports they 

did lab work and CT as well as give her pain medicine.





Review of Systems:


Review of Systems:


Constitutional:   Denies fever or chills. []


Eyes:   Denies change in visual acuity. []


HENT:   Denies nasal congestion or sore throat. [] 


Respiratory:   Denies cough or shortness of breath. [] 


Cardiovascular:   Denies chest pain or edema. [] 


GI:   Reports right lower quadrant abdominal pain, chronic diarrhea, denies 

nausea, vomiting, bloody stools 


:  Denies dysuria. [] 


Musculoskeletal:   Denies back pain or joint pain. [] 


Integument:   Denies rash. [] 


Neurologic:   Denies headache, focal weakness or sensory changes. [] 


Psychiatric:  Denies depression or anxiety. []





Heart Score:


C/O Chest Pain:  N/A


Risk Factors:


Risk Factors:  DM, Current or recent (<one month) smoker, HTN, HLP, family histo

ry of CAD, obesity.


Risk Scores:


Score 0 - 3:  2.5% MACE over next 6 weeks - Discharge Home


Score 4 - 6:  20.3% MACE over next 6 weeks - Admit for Clinical Observation


Score 7 - 10:  72.7% MACE over next 6 weeks - Early Invasive Strategies





Current Medications:





Current Medications








 Medications


  (Trade)  Dose


 Ordered  Sig/Blaine  Start Time


 Stop Time Status Last Admin


Dose Admin


 


 Info


  (CONTRAST GIVEN


 -- Rx MONITORING)  1 each  PRN DAILY  PRN  11/30/21 15:00


 12/2/21 14:59   





 


 Iohexol


  (Omnipaque 300


 Mg/ml)  75 ml  1X  ONCE  11/30/21 15:00


 11/30/21 15:01 DC 11/30/21 14:59


75 ML


 


 Morphine Sulfate


  (Morphine


 Sulfate)  4 mg  PRN Q15MIN  PRN  11/30/21 14:00


 12/1/21 13:59  11/30/21 14:31


4 MG


 


 Ondansetron HCl


  (Zofran)  4 mg  1X  ONCE  11/30/21 14:00


 11/30/21 14:01 DC 11/30/21 14:31


4 MG


 


 Sodium Chloride  1,000 ml @ 


 1,000 mls/hr  1X  ONCE  11/30/21 14:00


 11/30/21 14:59 DC 11/30/21 14:25


1,000 MLS/HR











Allergies:


Allergies:





Allergies








Coded Allergies Type Severity Reaction Last Updated Verified


 


  cephalexin Allergy Intermediate  11/30/21 Yes











Physical Exam:


PE:





Constitutional: Well developed, well nourished, no acute distress, non-toxic 

appearance. []


HENT: Normocephalic, atraumatic, bilateral external ears normal, oropharynx 

moist, no oral exudates, nose normal. []


Eyes: PERRLA, EOMI, conjunctiva normal, no discharge. [] 


Neck: Normal range of motion, no tenderness, supple, no stridor. [] 


Cardiovascular:Heart rate regular rhythm, no murmur []


Lungs & Thorax:  Bilateral breath sounds clear to auscultation []


Abdomen: Bowel sounds normal, soft, mild tenderness on palpation of the right 

lower quadrant, no right upper quadrant tenderness, no left upper quadrant or 

left lower quadrant tenderness no masses, no pulsatile masses. [] 


Skin: Warm, dry, no erythema, no rash. [] 


Back: No tenderness, no CVA tenderness. [] 


Extremities: No tenderness, no cyanosis, no clubbing, ROM intact, no edema. [] 


Neurologic: Alert and oriented X 3, normal motor function, normal sensory 

function, no focal deficits noted. []


Psychologic: Flat affect.





Current Patient Data:


Labs:





                                Laboratory Tests








Test


 11/30/21


13:58 11/30/21


14:04 11/30/21


14:07


 


White Blood Count


 5.6 x10^3/uL


(4.0-11.0) 


 





 


Red Blood Count


 4.87 x10^6/uL


(3.50-5.40) 


 





 


Hemoglobin


 13.9 g/dL


(12.0-15.5) 


 





 


Hematocrit


 41.7 %


(36.0-47.0) 


 





 


Mean Corpuscular Volume


 86 fL ()


 


 





 


Mean Corpuscular Hemoglobin 29 pg (25-35)    


 


Mean Corpuscular Hemoglobin


Concent 33 g/dL


(31-37) 


 





 


Red Cell Distribution Width


 14.5 %


(11.5-14.5) 


 





 


Platelet Count


 255 x10^3/uL


(140-400) 


 





 


Neutrophils (%) (Auto) 63 % (31-73)    


 


Lymphocytes (%) (Auto) 28 % (24-48)    


 


Monocytes (%) (Auto) 7 % (0-9)    


 


Eosinophils (%) (Auto) 2 % (0-3)    


 


Basophils (%) (Auto) 1 % (0-3)    


 


Neutrophils # (Auto)


 3.5 x10^3/uL


(1.8-7.7) 


 





 


Lymphocytes # (Auto)


 1.6 x10^3/uL


(1.0-4.8) 


 





 


Monocytes # (Auto)


 0.4 x10^3/uL


(0.0-1.1) 


 





 


Eosinophils # (Auto)


 0.1 x10^3/uL


(0.0-0.7) 


 





 


Basophils # (Auto)


 0.0 x10^3/uL


(0.0-0.2) 


 





 


Sodium Level


 139 mmol/L


(136-145) 


 





 


Potassium Level


 3.7 mmol/L


(3.5-5.1) 


 





 


Chloride Level


 102 mmol/L


() 


 





 


Carbon Dioxide Level


 29 mmol/L


(21-32) 


 





 


Anion Gap 8 (6-14)    


 


Blood Urea Nitrogen


 20 mg/dL


(7-20) 


 





 


Creatinine


 0.9 mg/dL


(0.6-1.0) 


 





 


Estimated GFR


(Cockcroft-Gault) 65.8  


 


 





 


BUN/Creatinine Ratio 22 (6-20)  H  


 


Glucose Level


 94 mg/dL


(70-99) 


 





 


Lactic Acid Level


 1.4 mmol/L


(0.4-2.0) 


 





 


Calcium Level


 8.9 mg/dL


(8.5-10.1) 


 





 


Magnesium Level


 2.1 mg/dL


(1.8-2.4) 


 





 


Total Bilirubin


 0.5 mg/dL


(0.2-1.0) 


 





 


Aspartate Amino Transferase


(AST) 17 U/L (15-37)


 


 





 


Alanine Aminotransferase (ALT)


 23 U/L (14-59)


 


 





 


Alkaline Phosphatase


 94 U/L


() 


 





 


Total Protein


 7.5 g/dL


(6.4-8.2) 


 





 


Albumin


 3.8 g/dL


(3.4-5.0) 


 





 


Albumin/Globulin Ratio 1.0 (1.0-1.7)    


 


Lipase


 91 U/L


() 


 





 


Urine Collection Type  Unknown   


 


Urine Color  Yellow   


 


Urine Clarity  Clear   


 


Urine pH


 


 5.5 (<5.0-8.0)


 





 


Urine Specific Gravity


 


 1.025


(1.000-1.030) 





 


Urine Protein


 


 Negative mg/dL


(NEG-TRACE) 





 


Urine Glucose (UA)


 


 Negative mg/dL


(NEG) 





 


Urine Ketones (Stick)


 


 Trace mg/dL


(NEG) 





 


Urine Blood  Trace (NEG)   


 


Urine Nitrite


 


 Negative (NEG)


 





 


Urine Bilirubin


 


 Negative (NEG)


 





 


Urine Urobilinogen Dipstick


 


 0.2 mg/dL (0.2


mg/dL) 





 


Urine Leukocyte Esterase


 


 Negative (NEG)


 





 


Urine RBC


 


 Occ /HPF (0-2)


 





 


Urine WBC


 


 Occ /HPF (0-4)


 





 


Urine Squamous Epithelial


Cells 


 Few /LPF  


 





 


Urine Bacteria


 


 0 /HPF (0-FEW)


 





 


Urine Mucus  Mod /LPF   


 


Urine Opiates Screen  Neg (NEG)   


 


Urine Methadone Screen  Neg (NEG)   


 


Urine Barbiturates  Neg (NEG)   


 


Urine Phencyclidine Screen  Neg (NEG)   


 


Urine


Amphetamine/Methamphetamine 


 Neg (NEG)  


 





 


Urine Benzodiazepines Screen  Pos (NEG)   


 


Urine Cocaine Screen  Neg (NEG)   


 


Urine Cannabinoids Screen  Neg (NEG)   


 


Urine Ethyl Alcohol  Neg (NEG)   


 


POC Urine HCG, Qualitative


 


 


 Hcg negative


(Negative)





                                Laboratory Tests


11/30/21 13:58








                                Laboratory Tests


11/30/21 13:58








Vital Signs:





                                   Vital Signs








  Date Time  Temp Pulse Resp B/P (MAP) Pulse Ox O2 Delivery O2 Flow Rate FiO2


 


11/30/21 15:04  80 16 116/61 (79) 97 Room Air  











EKG:


EKG:


[]





Radiology/Procedures:


Radiology/Procedures:


[]PROCEDURE: CT ABD PELV W/ IV CONTRST ONLY








INDICATION: Reason: abd pain / Spl. Instructions: [id number]  [phone] / 

History: 





COMPARISON: November 19, 2021





TECHNIQUE: 





Axial CT images were obtained through the abdomen and pelvis with intravenous 

contrast.  





One or more of the following individualized dose reduction techniques were 

utilized for this examination:  1. Automated exposure control;  2. Adjustment of

 the mA and/or kV according to patient size;  3. Use of iterative reconstruction

 technique.





FINDINGS:








Vascular: Scattered calcific atherosclerosis.


Hepatobiliary: Postcholecystectomy with prominence of the intrahepatic bile 

ducts which is a common finding postoperatively. Low-density lesion left lobe 

the liver again seen which is most likely cystic in nature.


Pancreas: No peripancreatic edema.


Spleen: Spleen unremarkable.


Renal/Bladder: Prominence of the bilateral renal pelvis again seen. Urinary 

bladder has minimal urine within it at time of exam.


Gastrointestinal: Postoperative changes to the colon with suture line seen in 

the sigmoid region as well as right side of the colon. Colonic diverticulosis. 

Colon is largely decompressed with some regions with prominence the wall. This 

includes at the right side of the colon with some subtle haziness to the fat 

again seen. No dilated loops of bowel to suggest obstruction.


Repeat demonstration of small anterior abdominal wall hernia which is similar to

 prior.


Degenerative changes the spine with multilevel central canal and neural 

foraminal stenosis.





IMPRESSION:





*   There is repeat demonstration of some prominence of the colon wall with 

small amount haziness the fat adjacent to a portion of the right side of the 

colon. A portion of the prominence the wall is likely secondary to lack of 

distention but given the mild haziness the fat this could be secondary to a 

small amount of residual inflammation from causes such as colitis if the patient

 has appropriate symptoms. The haziness the fat appears slightly decreased from 

prior.





*  Postcholecystectomy with prominence of bile ducts which is a common finding 

postoperatively.





Electronically signed by: Michelle Blancas MD (11/30/2021 3:31 PM) [id number]














DICTATED and SIGNED BY:     MICHELLE BLANCAS MD


DATE:     11/30/21 [id number]





Course & Med Decision Making:


Course & Med Decision Making


Pertinent Labs and Imaging studies reviewed. (See chart for details)





This is a 51-year-old female patient with history of chronic abdominal pain with

 multiple surgeries including appendectomy, cholecystectomy, colon resection 

presenting today complaining of right lower quadrant abdominal pain that is 

chronic but got worse this morning.





Patient was seen at [facility] in October as well as November for 

similar complaints.





CBC, CMP, lipase with no acute findings. UA is negative





CT of the abdomen and pelvis*   There is repeat demonstration of some prominence

 of the colon wall with small amount haziness the fat adjacent to a portion of 

the right side of the colon. A portion of the prominence the wall is likely 

secondary to lack of distention but given the mild haziness the fat this could 

be secondary to a small amount of residual inflammation from causes such as 

colitis if the patient has appropriate symptoms. The haziness the fat appears 

slightly decreased from prior. Postcholecystectomy with prominence of bile ducts

 which is a common finding postoperatively.





Results were communicated to patient and , patient stated she has hernia 

surgery scheduled in 2 months with Dr. Lyman but cannot wait until then.  She 

states she wants this done ASAP.  Informed that there is no indication for 

having hernia repair emergently.  Informed her she can follow-up as an 

outpatient with Dr. Lyman and have this set up.  She states Dr. Lyman is 

booked up and now wants Dr. Echavarria to do the procedure.  She states she needs to 

be admitted to have this done as an inpatient.  





Spoke with Dr. Luz who accepted patient for admission, routine consult placed 

for general surgery.





Dragon Disclaimer:


Dragon Disclaimer:


This electronic medical record was generated, in whole or in part, using a voice

 recognition dictation system.





Departure


Departure


Impression:  


   Primary Impression:  


   Intractable abdominal pain


Disposition:  09 ADMITTED AS INPATIENT


Condition:  STABLE


Referrals:  


ASHLEE BOWEN (PCP)











CRISPIN TSE APRN            Nov 30, 2021 15:41

## 2021-12-01 VITALS — DIASTOLIC BLOOD PRESSURE: 46 MMHG | SYSTOLIC BLOOD PRESSURE: 87 MMHG

## 2021-12-01 VITALS — SYSTOLIC BLOOD PRESSURE: 129 MMHG | DIASTOLIC BLOOD PRESSURE: 72 MMHG

## 2021-12-01 VITALS — DIASTOLIC BLOOD PRESSURE: 60 MMHG | SYSTOLIC BLOOD PRESSURE: 87 MMHG

## 2021-12-01 VITALS — DIASTOLIC BLOOD PRESSURE: 63 MMHG | SYSTOLIC BLOOD PRESSURE: 113 MMHG

## 2021-12-01 VITALS — DIASTOLIC BLOOD PRESSURE: 76 MMHG | SYSTOLIC BLOOD PRESSURE: 116 MMHG

## 2021-12-01 VITALS — DIASTOLIC BLOOD PRESSURE: 46 MMHG | SYSTOLIC BLOOD PRESSURE: 99 MMHG

## 2021-12-01 LAB
ALBUMIN SERPL-MCNC: 3.1 G/DL (ref 3.4–5)
ALBUMIN/GLOB SERPL: 1.1 {RATIO} (ref 1–1.7)
ALP SERPL-CCNC: 79 U/L (ref 46–116)
ALT SERPL-CCNC: 19 U/L (ref 14–59)
ANION GAP SERPL CALC-SCNC: 9 MMOL/L (ref 6–14)
AST SERPL-CCNC: 21 U/L (ref 15–37)
BASOPHILS # BLD AUTO: 0 X10^3/UL (ref 0–0.2)
BASOPHILS NFR BLD: 1 % (ref 0–3)
BILIRUB SERPL-MCNC: 0.6 MG/DL (ref 0.2–1)
BUN SERPL-MCNC: 19 MG/DL (ref 7–20)
BUN/CREAT SERPL: 27 (ref 6–20)
CALCIUM SERPL-MCNC: 8.1 MG/DL (ref 8.5–10.1)
CHLORIDE SERPL-SCNC: 106 MMOL/L (ref 98–107)
CO2 SERPL-SCNC: 25 MMOL/L (ref 21–32)
CREAT SERPL-MCNC: 0.7 MG/DL (ref 0.6–1)
EOSINOPHIL NFR BLD: 0.2 X10^3/UL (ref 0–0.7)
EOSINOPHIL NFR BLD: 4 % (ref 0–3)
ERYTHROCYTE [DISTWIDTH] IN BLOOD BY AUTOMATED COUNT: 14.8 % (ref 11.5–14.5)
GFR SERPLBLD BASED ON 1.73 SQ M-ARVRAT: 87.9 ML/MIN
GLUCOSE SERPL-MCNC: 77 MG/DL (ref 70–99)
HCT VFR BLD CALC: 37.1 % (ref 36–47)
HGB BLD-MCNC: 12 G/DL (ref 12–15.5)
LYMPHOCYTES # BLD: 2.2 X10^3/UL (ref 1–4.8)
LYMPHOCYTES NFR BLD AUTO: 48 % (ref 24–48)
MCH RBC QN AUTO: 28 PG (ref 25–35)
MCHC RBC AUTO-ENTMCNC: 32 G/DL (ref 31–37)
MCV RBC AUTO: 87 FL (ref 79–100)
MONO #: 0.4 X10^3/UL (ref 0–1.1)
MONOCYTES NFR BLD: 8 % (ref 0–9)
NEUT #: 1.8 X10^3/UL (ref 1.8–7.7)
NEUTROPHILS NFR BLD AUTO: 39 % (ref 31–73)
PLATELET # BLD AUTO: 236 X10^3/UL (ref 140–400)
POTASSIUM SERPL-SCNC: 3.6 MMOL/L (ref 3.5–5.1)
PROT SERPL-MCNC: 6 G/DL (ref 6.4–8.2)
RBC # BLD AUTO: 4.27 X10^6/UL (ref 3.5–5.4)
SODIUM SERPL-SCNC: 140 MMOL/L (ref 136–145)
WBC # BLD AUTO: 4.5 X10^3/UL (ref 4–11)

## 2021-12-01 RX ADMIN — VITAMIN D, TAB 1000IU (100/BT) SCH UNIT: 25 TAB at 09:00

## 2021-12-01 RX ADMIN — PANTOPRAZOLE SODIUM SCH MG: 40 TABLET, DELAYED RELEASE ORAL at 16:43

## 2021-12-01 RX ADMIN — COLESTIPOL HYDROCHLORIDE SCH GM: 1 TABLET ORAL at 21:33

## 2021-12-01 RX ADMIN — KETOROLAC TROMETHAMINE PRN MG: 30 INJECTION, SOLUTION INTRAMUSCULAR at 08:38

## 2021-12-01 RX ADMIN — FENTANYL CITRATE PRN MCG: 50 INJECTION INTRAMUSCULAR; INTRAVENOUS at 08:58

## 2021-12-01 RX ADMIN — HYDROMORPHONE HYDROCHLORIDE PRN MG: 2 INJECTION INTRAMUSCULAR; INTRAVENOUS; SUBCUTANEOUS at 23:23

## 2021-12-01 RX ADMIN — HYDROMORPHONE HYDROCHLORIDE PRN MG: 2 INJECTION INTRAMUSCULAR; INTRAVENOUS; SUBCUTANEOUS at 14:12

## 2021-12-01 RX ADMIN — ZOLPIDEM TARTRATE PRN MG: 5 TABLET ORAL at 21:34

## 2021-12-01 RX ADMIN — METOCLOPRAMIDE PRN MG: 5 INJECTION, SOLUTION INTRAMUSCULAR; INTRAVENOUS at 08:37

## 2021-12-01 RX ADMIN — HYDROMORPHONE HYDROCHLORIDE PRN MG: 2 INJECTION INTRAMUSCULAR; INTRAVENOUS; SUBCUTANEOUS at 18:36

## 2021-12-01 NOTE — PDOC2
GI CONSULT


Date of Service:


DATE: 12/1/21 


TIME: 14:44





Reason For Consult:


abd pain with questionable right colon colitis





HPI:


HPI:


50 y/o female who has seen Dr. Gardiner in the past.


Chronic GI issues w/ complicated GI/surgical history (below).


Symptoms including chronic right mid/lower abdominal/pelvic pain ("like a 

bowling ball" and severe cramping), diarrhea, nausea, and lethargy have been 

worse since a couple weeks after her last surgery in 6/2021.


Diarrhea is orange-yellow and watery, says was normal-colored before.  Healthy 

foods like vegetable (even cooked) pass through very quickly - will eat lettuce 

and 15 min later it will be in the toilet, and she stopped taking multivitamin 

because she was pooping them out whole.  Has less diarrhea if she eats bread.


She doesn't eat much to avoid diarrhea but has gained ~25 pounds since 8/2019.  

Says she can't exercise because she is so fatigued.


Saw Dr. Lyman as outpt; colonoscopy and elective hernia repair was planned.





This admission:


Labs unremarkable.


Question of lack of distention vs right-sided colitis on CT which is why we are 

asked to see.





H/o large cecal polyp on colonoscopy at age 39 - resection suggested then but 

not pursued.  Says for this (?) and diverticulitis had LAR w/ ileostomy 

placement in 8/2019 @ , then reversal in 12/2019.  Then transferred from [facility] 

to [facility] in 1/2020 w/ concern for bowel ischemia - treated w/ atbx at [facility].  

Then followed-up at  and had appendectomy and SBR in 5/2020 for ischemia and 

perforations - apparently some complications w/ this ("infected stitches") 

requiring another procedure in 7/2020 and wound vac.  Last colonoscopy @  in 

11/2020 (says note 7/2020 as in last GI consult note) reportedly normal except 

diverticulosis.  No polyps then - apparently at one point int he past had >25 

polyps on colonoscopy.  No IBD history.


H/o gurgling in esophagus after drinking diet Mt. Dew - improved w/ avoidance of

Mt. Dew and Pepcid (which she's not currently taking due to lack of gurgling).


H/o chronic diarrhea/loose stools since surgeries in 2019.  When we saw in 

6/2021, she reported urgent yellow-orange stools worse after eating vegetables 

and better after eating carbs.  At that time described eating salad and 15 

minutes later seeing lettuce in the toilet.  H/o LARS (lower anterior resection 

syndrome) - diagnosed @ .  Previous trials of fiber and Creon made her more 

sick.  Trial of Pentasa x 2 weeks was ineffective.  Imodium helps - takes if she

has to leave the house or if she's going to try eating more than just a little 

bit.  Usually takes Bentyl for cramping - has been out.


S/p cholecystectomy (GB EF 5%, normal IOC, no cholelithiasis) and incisional 

hernia repair w/ Dr. Echavarria in 6/2021.


Probable hepatic cyst on past US and CT.


H/o ovarian/cervical/uterine cancer s/p hysterectomy in 1999, opted for no 

chemo/rad.  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 

"liquid" treatment.


S/p gastric sleeve @ [facility] in 2009.


She reports rapid emptying on recent GES and recalls past SBS @  before one of

her surgeries.


No dysphagia, vomiting, constipation/hard stools, or hematochezia.  Passed 

something that looked like a white jellyfish (maybe mucous) recently and within 

the past month had some stool that looked like coffee-grounds.


No pancreas or PUD history.  Has been taking Tylenol, Excedrin, and Zofran at 

home.


Feels like something is really wrong - she knows her body - doesn't think she 

needs a colonoscopy but really would like surgery.   Jose C present.





PMH:


PMH:


see HPI and additionally:


anxiety/depression, allergic rhinitis


hysterectomy





FH:


Family History:  Cancer (MGM - colon, throat; mother - colon)





Social History:


ALCOHOL:  rare


Drugs:  None





ROS:





GEN: Denies fevers, chills, sweats


HEENT: Denies blurred vision, sore throat


CV: Denies chest pain


RESP: Denies shortness of air, cough


GI: Per HPI


: Denies hematuria, dysuria


ENDO: Denies weight changes


NEURO: Denies confusion, dizziness


MSK: Denies weakness, joint pain/swelling


SKIN: Denies jaundice, pruritus





Vitals:


Vitals:





                                   Vital Signs








  Date Time  Temp Pulse Resp B/P (MAP) Pulse Ox O2 Delivery O2 Flow Rate FiO2


 


12/1/21 11:00 98.9 63 18 99/46 (63)    





 98.9       


 


12/1/21 03:00     97 Room Air  











Labs:


Labs:





Laboratory Tests








Test


 11/30/21


18:10 12/1/21


04:25


 


SARS-CoV-2 RNA (ALEKSANDRA)


 Negative


(Negative) 





 


SARS-CoV-2 Antigen (Rapid)


 Negative


(NEGATIVE) 





 


White Blood Count


 


 4.5 x10^3/uL


(4.0-11.0)


 


Red Blood Count


 


 4.27 x10^6/uL


(3.50-5.40)


 


Hemoglobin


 


 12.0 g/dL


(12.0-15.5)


 


Hematocrit


 


 37.1 %


(36.0-47.0)


 


Mean Corpuscular Volume  87 fL () 


 


Mean Corpuscular Hemoglobin  28 pg (25-35) 


 


Mean Corpuscular Hemoglobin


Concent 


 32 g/dL


(31-37)


 


Red Cell Distribution Width


 


 14.8 %


(11.5-14.5)


 


Platelet Count


 


 236 x10^3/uL


(140-400)


 


Neutrophils (%) (Auto)  39 % (31-73) 


 


Lymphocytes (%) (Auto)  48 % (24-48) 


 


Monocytes (%) (Auto)  8 % (0-9) 


 


Eosinophils (%) (Auto)  4 % (0-3) 


 


Basophils (%) (Auto)  1 % (0-3) 


 


Neutrophils # (Auto)


 


 1.8 x10^3/uL


(1.8-7.7)


 


Lymphocytes # (Auto)


 


 2.2 x10^3/uL


(1.0-4.8)


 


Monocytes # (Auto)


 


 0.4 x10^3/uL


(0.0-1.1)


 


Eosinophils # (Auto)


 


 0.2 x10^3/uL


(0.0-0.7)


 


Basophils # (Auto)


 


 0.0 x10^3/uL


(0.0-0.2)


 


Sodium Level


 


 140 mmol/L


(136-145)


 


Potassium Level


 


 3.6 mmol/L


(3.5-5.1)


 


Chloride Level


 


 106 mmol/L


()


 


Carbon Dioxide Level


 


 25 mmol/L


(21-32)


 


Anion Gap  9 (6-14) 


 


Blood Urea Nitrogen


 


 19 mg/dL


(7-20)


 


Creatinine


 


 0.7 mg/dL


(0.6-1.0)


 


Estimated GFR


(Cockcroft-Gault) 


 87.9 





 


BUN/Creatinine Ratio  27 (6-20) 


 


Glucose Level


 


 77 mg/dL


(70-99)


 


Calcium Level


 


 8.1 mg/dL


(8.5-10.1)


 


Total Bilirubin


 


 0.6 mg/dL


(0.2-1.0)


 


Aspartate Amino Transf


(AST/SGOT) 


 21 U/L (15-37) 





 


Alanine Aminotransferase


(ALT/SGPT) 


 19 U/L (14-59) 





 


Alkaline Phosphatase


 


 79 U/L


()


 


Total Protein


 


 6.0 g/dL


(6.4-8.2)


 


Albumin


 


 3.1 g/dL


(3.4-5.0)


 


Albumin/Globulin Ratio  1.1 (1.0-1.7) 





  BLOOD CULTURE  Preliminary  


        NO GROWTH AFTER 1 DAY





Allergies:


Coded Allergies:  


     cephalexin (Verified  Allergy, Intermediate, 11/30/21)





Medications:





Current Medications








 Medications


  (Trade)  Dose


 Ordered  Sig/Blaine


 Route


 PRN Reason  Start Time


 Stop Time Status Last Admin


Dose Admin


 


 Iohexol


  (Omnipaque 300


 Mg/ml)  75 ml  1X  ONCE


 IV


   11/30/21 15:00


 11/30/21 15:01 DC 11/30/21 14:59





 


 Ketorolac


 Tromethamine


  (Toradol 30mg


 Vial)  30 mg  1X  ONCE


 IVP


   11/30/21 17:00


 11/30/21 17:01 DC 11/30/21 17:00





 


 Acetaminophen


  (Tylenol)  1,000 mg  1X  ONCE


 PO


   11/30/21 17:00


 11/30/21 17:01 DC 11/30/21 17:01





 


 Metoclopramide HCl


  (Reglan Vial)  10 mg  1X  ONCE


 IVP


   11/30/21 17:15


 11/30/21 17:16 DC 11/30/21 17:13





 


 Fentanyl Citrate


  (Fentanyl 2ml


 Vial)  50 mcg  PRN Q1HR  PRN


 IVP


 PAIN  11/30/21 17:30


 12/1/21 17:29  12/1/21 08:58





 


 Sodium Chloride  1,000 ml @ 


 75 mls/hr  1X  ONCE


 IV


   11/30/21 17:30


 12/1/21 06:49 DC 11/30/21 21:00





 


 Metoclopramide HCl


  (Reglan Vial)  10 mg  TID PRN  PRN


 IVP


 NAUSEA  11/30/21 17:30


    12/1/21 08:37





 


 Ketorolac


 Tromethamine


  (Toradol 30mg


 Vial)  30 mg  PRN Q6HRS  PRN


 IVP


 INFLAMMATION  11/30/21 21:45


 12/5/21 21:44  12/1/21 08:38





 


 Zolpidem Tartrate


  (Ambien)  5 mg  PRN QHS  PRN


 PO


 INSOMNIA  11/30/21 21:45


    11/30/21 22:05





 


 Clonazepam


  (KlonoPIN)  2 mg  PRN QHS  PRN


 PO


 ANXIETY / AGITATION  11/30/21 21:45


    11/30/21 22:05





 


 Hydromorphone HCl


  (Dilaudid)  0.4 mg  PRN Q4HRS  PRN


 IVP


 PAIN  12/1/21 13:15


    12/1/21 14:12














Imaging:


Imaging:


CT A/P 11/30


FINDINGS:


Vascular: Scattered calcific atherosclerosis.


Hepatobiliary: Postcholecystectomy with prominence of the intrahepatic bile 

ducts which is a common finding postoperatively. Low-density lesion left lobe 

the liver again seen which is most likely cystic in nature.


Pancreas: No peripancreatic edema.


Spleen: Spleen unremarkable.


Renal/Bladder: Prominence of the bilateral renal pelvis again seen. Urinary 

bladder has minimal urine within it at time of exam.


Gastrointestinal: Postoperative changes to the colon with suture line seen in 

the sigmoid region as well as right side of the colon. Colonic diverticulosis. 

Colon is largely decompressed with some regions with prominence the wall. This 

includes at the right side of the colon with some subtle haziness to the fat 

again seen. No dilated loops of bowel to suggest obstruction.


Repeat demonstration of small anterior abdominal wall hernia which is similar to

prior.


Degenerative changes the spine with multilevel central canal and neural 

foraminal stenosis.


IMPRESSION:


*   There is repeat demonstration of some prominence of the colon wall with 

small amount haziness the fat adjacent to a portion of the right side of the 

colon. A portion of the prominence the wall is likely secondary to lack of 

distention but given the mild haziness the fat this could be secondary to a 

small amount of residual inflammation from causes such as colitis if the patient

has appropriate symptoms. The haziness the fat appears slightly decreased from 

prior.


*  Postcholecystectomy with prominence of bile ducts which is a common finding 

postoperatively.





PE:





GEN: NAD - briefly tearful


HEENT: Atraumatic, PERRL


LUNGS: CTAB


HEART: RRR


ABD: NABS, S/ND, lower right periumbilical region is most tender


EXTREMITY: No edema


SKIN: No rashes, no jaundice, previous abd surg scars


NEURO/PSYCH: A & O 3





A/P:


A/P:


Chronic abd pain and diarrhea, fatigue, nausea


Question of colitis on CT


?GERD


CRC screen, h/o colon polyps (UTD - 11/2020 @ [facility])


Diverticular disease


S/p cholecystectomy, gastric sleeve, LAR w/ ileostomy/reversal, SBR, hernia 

repair


Suspected hepatic cysts


H/o ovarian cancer


Headache - new


FH colon cancer





--


Chronic/worsening symptoms.


Reviewed w/ Dr. Gardiner - recommends trial of Colestid.


We'll also check stool studies and TSH.


No plans for inpatient colonoscopy at this time.


Okay to try eating per GI.











SHELLEY SOTELO          Dec 1, 2021 15:16

## 2021-12-01 NOTE — HP
DATE OF SERVICE: 12/01/2021

ADMIT DATE: 11/30/2021

HISTORY OF PRESENT ILLNESS:  The patient is a 51-year-old  female 

patient with multiple abdominal surgeries including colon resection, abdominal 

hernia repair, diverticulitis, who presented to the Emergency Room of [facility] with chronic abdominal pain that has gotten worse yesterday 

morning.  She describes the pain as sharp and constant, rates the pain as 7/10, 

the pain is on the right lower quadrant.  Reports chronic diarrhea.  She denied 

any nausea or vomiting.  She states she ran out of her oxycodone a couple of 

days ago.  She states that she is supposed to have ventral hernia repair surgery

in 2 months with Dr. Lyman and cannot wait until then.  She was seen at [facility] on 11/19/2021.  At the time, she had had a CT scan done and was 

seen by Dr. Lyman next day at his clinic, who apparently recommended doing a 

colonoscopy before proceeding with ventral hernia repair.  She was extensively 

investigated in the Emergency Room and has had lab work as well as imaging 

studies.  Her lab work were unremarkable, in fact, her white cell count was only

5600.  Her procalcitonin was 0.1, lactic acid is 1.4 and her chemistry was also 

unrevealing.  Urinalysis essentially unremarkable and her toxic screen was 

positive for benzodiazepine.  Has had a CT scan of the abdomen and pelvis with 

intravenous contrast, which basically showed that she has a post-cholecystectomy

with prominence of the intrahepatic bile ducts, which is a common finding 

postoperatively.  She has low density lesion in the left lobe of the liver, 

again seen, which is almost likely a cystic in nature.  There is no 

peripancreatic edema.  The spleen is unremarkable.  There is prominence of the 

bilateral renal pelvis again seen and the urinary bladder has minimal urine 

within it at time of exam, she has postoperative changes of the colon with 

suture lines seen in the sigmoid region as well as right side of the colon.  She

has chronic diverticulosis.  The colon is largely decompressed with some regions

with prominence wall.  This includes the right side of the colon with some 

subtle haziness of the fat again seen.  No dilated loops of bowel to suggest 

obstruction.  Repeat demonstration with small anterior abdominal wall hernia, 

which is similar to prior.  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  The patient was admitted, 

was kept n.p.o., started on IV fluid, IV pain medication and antiemetic.



PAST MEDICAL HISTORY:  Significant for diverticulitis as well as hyperlipidemia.



PAST SURGICAL HISTORY:  Significant for appendectomy, partial colectomy, gastric

sleeve and partial thyroidectomy.  She also had developed periumbilical 

incisional hernia for which she was scheduled to repair with Dr. Lyman on 

06/17.



Her other past medical history significant for ovarian cancer.



FAMILY HISTORY:  Positive for colon cancer for which she has multiple 

colonoscopies.



SOCIAL HISTORY:  She is  and lives with her family.  She does not smoke. 

She quit smoking.  She drinks alcohol occasionally.  Does not use any drugs.



MEDICATIONS:  She is currently on following medications:  She is on 

hydrocodone/APAP 5/325 one tablet every 6 hours, clonazepam 2 mg at bedtime, 

escitalopram oxalate 10 mg daily, Ambien 5 mg at bedtime, famotidine 20 mg at 

bedtime, cholecalciferol vitamin D3 10 mcg once a day.



REVIEW OF SYSTEMS:  As per history of present illness.



PHYSICAL EXAMINATION:

GENERAL:  On examining her, the patient looked well and was clearly in no 

apparent respiratory distress.  No pallor, jaundice, cyanosis, or thyromegaly.  

No jugular venous distention.  No lower limb edema.

VITAL SIGNS:  Her heart rate was 54, blood pressure was 87/45, temperature was 

97.8, respiratory rate was 18 and oxygen saturation was 97% on room air.

HEAD, EYES, EARS, NOSE, AND THROAT:  Normocephalic, atraumatic.

NECK:  Supple.

HEART:  Showed normal first and second heart sounds.  No gallop or murmur.

CHEST:  Shows central trachea, equal bilateral expansion, air entry, vesicular 

breath sounds.  No crepitation or rhonchi.

ABDOMEN:  Scaphoid, mostly soft except in the right lower quadrant and 

suprapubic catheter area and around the umbilical area and there is no guarding 

or rigidity.  No organomegaly.  Bowel sounds are normal.



LABORATORY DATA:  On arrival to the Emergency Room showed her white cell count 

to be 5600, hemoglobin 14, hematocrit 42, MCV 86 and platelet count 255,000 with

normal manual differential.  Her serum sodium was 139, potassium 3.7, chloride 

102, bicarbonate 29, anion gap of 8, BUN 20, creatinine 0.9.  Estimated GFR was 

65 mL per minute.  Her glucose was 94.  Lactic acid was 1.4, calcium was 8.9, 

magnesium 2.1.  Total bilirubin, AST, ALT, alkaline phosphatase were normal.  

Total protein 7.5, albumin 3.8 and lipase was 91.  Urinalysis essentially 

unremarkable and tox screen was positive for benzodiazepines.  Her coronavirus 

by PCR was negative.



ASSESSMENT:  So, in summary, this is a 51-year-old  female patient who 

came with a complaint of chronic abdominal pain that has worsened yesterday.  

Her CT scan of the abdomen and pelvis with IV contrast showed repeat 

demonstration of some prominence of the colon wall with small amount of haziness

of the fat adjacent to a portion of the right side of the colon, a portion of 

the prominence, the wall is likely secondary to lack of distention, but given 

the mild haziness of the fat.  This could be secondary to a small amount of 

residual inflammation from cause such as colitis.  The haziness of the fat 

appears slightly decreased from prior.  There is a possible cystectomy with 

prominence of the bile duct, which is a common finding postoperatively.  She 

also had repeat demonstration of small anterior abdominal wall hernia, which is 

similar to prior.



PLAN:  To keep her n.p.o.  Continue with IV fluid, IV pain medication and 

antiemetics.  I resumed all her other medications.  I have consulted the Dr. Lyman.







CYNDEE/JACEY SCHREIBER: AMM/kuldip   DD: 12/01/2021 11:21

DT: 12/01/2021 12:04   TID: [id number]

## 2021-12-01 NOTE — NUR
SW following. Discussed with RN, pt from home with , room air, NPO. COVID-19 
negative. GI and Surgery following. RN advised no SW needs at this time. SW will continue to 
follow.

## 2021-12-01 NOTE — PDOC2
PAMELA RICARDO APRN 12/1/21 1139:


CONSULT


Date of Consult


Date of Consult


DATE: 12/1/21 


TIME: 11:30





Reason for Consult


Reason for Consult:


abd pain





Referring Physician


Referring Physician:


ER





Identification/Chief Complaint


Chief Complaint


abdominal pain





Source


Source:  Chart review, Patient





History of Present Illness


Reason for Visit:


Ongoing issues with right sided abdominal and pelvis pain.  It has gotten worse 

and become more unbearable.  Seems worse with stools.  She has been seen by Dr Valverde, tentatively planning colonoscopy then elective hernia repair.  She 

reports can not wait until Jan for surgery, she can not discharge without this 

being treated.  Something is very wrong





Past Medical History


Heme/Onc:  Cancer





Past Surgical History


Past Surgical History:  Appendectomy, Cholecystectomy, Hernia Repair, Colon 

Resection, Other





Family History


Family History:  Other





Social History


ALCOHOL:  rare


Drugs:  None


Lives:  with Family





Current Problem List


Problem List


Problems


Medical Problems:


(1) Intractable abdominal pain


Status: Acute  











Current Medications


Current Medications





Current Medications


Morphine Sulfate (Morphine Sulfate) 4 mg PRN Q15MIN  PRN IV/SQ PAIN GREATER THAN

3/10 Last administered on 11/30/21at 17:00;  Start 11/30/21 at 14:00;  Stop 

12/1/21 at 13:59


Sodium Chloride 1,000 ml @  1,000 mls/hr 1X  ONCE IV  Last administered on 11/30 /21at 14:25;  Start 11/30/21 at 14:00;  Stop 11/30/21 at 14:59;  Status DC


Ondansetron HCl (Zofran) 4 mg 1X  ONCE IVP  Last administered on 11/30/21at 

14:31;  Start 11/30/21 at 14:00;  Stop 11/30/21 at 14:01;  Status DC


Iohexol (Omnipaque 300 Mg/ml) 75 ml 1X  ONCE IV  Last administered on 11/30/21at

14:59;  Start 11/30/21 at 15:00;  Stop 11/30/21 at 15:01;  Status DC


Info (CONTRAST GIVEN -- Rx MONITORING) 1 each PRN DAILY  PRN MC SEE COMMENTS;  

Start 11/30/21 at 15:00;  Stop 12/2/21 at 14:59


Ketorolac Tromethamine (Toradol 30mg Vial) 30 mg 1X  ONCE IVP  Last administered

on 11/30/21at 17:00;  Start 11/30/21 at 17:00;  Stop 11/30/21 at 17:01;  Status 

DC


Acetaminophen (Tylenol) 1,000 mg 1X  ONCE PO  Last administered on 11/30/21at 

17:01;  Start 11/30/21 at 17:00;  Stop 11/30/21 at 17:01;  Status DC


Metoclopramide HCl (Reglan Vial) 10 mg 1X  ONCE IVP  Last administered on 

11/30/21at 17:13;  Start 11/30/21 at 17:15;  Stop 11/30/21 at 17:16;  Status DC


Ondansetron HCl (Zofran) 4 mg PRN Q8HRS  PRN IVP NAUSEA/VOMITING;  Start 

11/30/21 at 17:30;  Stop 12/1/21 at 17:29


Fentanyl Citrate (Fentanyl 2ml Vial) 50 mcg PRN Q1HR  PRN IVP PAIN Last adm

inistered on 12/1/21at 08:58;  Start 11/30/21 at 17:30;  Stop 12/1/21 at 17:29


Sodium Chloride 1,000 ml @  75 mls/hr 1X  ONCE IV  Last administered on 

11/30/21at 21:00;  Start 11/30/21 at 17:30;  Stop 12/1/21 at 06:49;  Status DC


Metoclopramide HCl (Reglan Vial) 10 mg TID PRN  PRN IVP NAUSEA Last administered

on 12/1/21at 08:37;  Start 11/30/21 at 17:30


Ketorolac Tromethamine (Toradol 30mg Vial) 30 mg PRN Q6HRS  PRN IVP INFLAMMATION

Last administered on 12/1/21at 08:38;  Start 11/30/21 at 21:45;  Stop 12/5/21 at

21:44


Zolpidem Tartrate (Ambien) 5 mg PRN QHS  PRN PO INSOMNIA Last administered on 

11/30/21at 22:05;  Start 11/30/21 at 21:45


Vitamin D (Vitamin D3) 1,000 unit DAILY PO ;  Start 12/1/21 at 09:00


Clonazepam (KlonoPIN) 2 mg PRN QHS  PRN PO ANXIETY / AGITATION Last administered

on 11/30/21at 22:05;  Start 11/30/21 at 21:45


Citalopram Hydrobromide (CeleXA) 20 mg HS PO ;  Start 11/30/21 at 22:00;  Status

Cancel





Active Scripts


Active


Hydrocodone-Apap 5-325  ** (Hydrocodone Bit/Acetaminophen) 1 Tab Tablet 1 Tab PO

PRN Q6HRS PRN 10 Days


Reported


Lexapro (Escitalopram Oxalate) 10 Mg Tablet 1 Tab PO HS


Vitamin D3 (Cholecalciferol (Vitamin D3)) 10 Mcg Capsule 10 Mcg PO DAILY08


Pepcid (Famotidine) 20 Mg Tablet 20 Mg PO HS


Klonopin (Clonazepam) 2 Mg Tablet 1 Tab PO PRN QHS PRN


Escitalopram Oxalate 10 Mg Tablet 10 Mg PO HS


Ambien (Zolpidem Tartrate) 5 Mg Tablet 5 Mg PO PRN QHS PRN





Allergies


Allergies:  


Coded Allergies:  


     cephalexin (Verified  Allergy, Intermediate, 11/30/21)





ROS


General:  No: Chills, Fatigue


PSYCHOLOGICAL ROS:  No: Anxiety, Depression


Eyes:  No Blurry vision, No Double vision


HEENT:  No: Heacaches, Sore Throat


Hematological and Lymphatic:  No: Bleeding Problems, Blood Clots


Respiratory:  No: Cough


Cardiovascular:  No Chest Pain, No Palpitations


Gastrointestinal:  Yes Other (see hpi)


Genitourinary:  No Dysuria, No Hematuria


Musculoskeletal:  No Joint Pain, No Muscle Pain


Neurological:  No Impaired Coord/balance, No Numbness/Tingling


Skin:  No Pruritus, No Rash





Physical Exam


General:  Alert, Oriented X3, Cooperative


HEENT:  Atraumatic, PERRLA


Lungs:  Clear to auscultation, Normal air movement


Heart:  Regular rate, Normal S1, Normal S2


Abdomen:  Soft, Other (ND, ttp lower right abdomen )


Extremities:  No clubbing, No cyanosis


Skin:  No rashes, No breakdown


Neuro:  Normal gait


Psych/Mental Status:  Mental status NL, Mood NL





Vitals


VITALS





Vital Signs








  Date Time  Temp Pulse Resp B/P (MAP) Pulse Ox O2 Delivery O2 Flow Rate FiO2


 


12/1/21 07:00 97.8 54 18 87/46 (60)    





 97.8       


 


12/1/21 03:00     97 Room Air  











Labs


Labs





Laboratory Tests








Test


 11/30/21


13:58 11/30/21


14:04 11/30/21


14:07 11/30/21


18:10


 


White Blood Count


 5.6 x10^3/uL


(4.0-11.0) 


 


 





 


Red Blood Count


 4.87 x10^6/uL


(3.50-5.40) 


 


 





 


Hemoglobin


 13.9 g/dL


(12.0-15.5) 


 


 





 


Hematocrit


 41.7 %


(36.0-47.0) 


 


 





 


Mean Corpuscular Volume 86 fL ()    


 


Mean Corpuscular Hemoglobin 29 pg (25-35)    


 


Mean Corpuscular Hemoglobin


Concent 33 g/dL


(31-37) 


 


 





 


Red Cell Distribution Width


 14.5 %


(11.5-14.5) 


 


 





 


Platelet Count


 255 x10^3/uL


(140-400) 


 


 





 


Neutrophils (%) (Auto) 63 % (31-73)    


 


Lymphocytes (%) (Auto) 28 % (24-48)    


 


Monocytes (%) (Auto) 7 % (0-9)    


 


Eosinophils (%) (Auto) 2 % (0-3)    


 


Basophils (%) (Auto) 1 % (0-3)    


 


Neutrophils # (Auto)


 3.5 x10^3/uL


(1.8-7.7) 


 


 





 


Lymphocytes # (Auto)


 1.6 x10^3/uL


(1.0-4.8) 


 


 





 


Monocytes # (Auto)


 0.4 x10^3/uL


(0.0-1.1) 


 


 





 


Eosinophils # (Auto)


 0.1 x10^3/uL


(0.0-0.7) 


 


 





 


Basophils # (Auto)


 0.0 x10^3/uL


(0.0-0.2) 


 


 





 


Sodium Level


 139 mmol/L


(136-145) 


 


 





 


Potassium Level


 3.7 mmol/L


(3.5-5.1) 


 


 





 


Chloride Level


 102 mmol/L


() 


 


 





 


Carbon Dioxide Level


 29 mmol/L


(21-32) 


 


 





 


Anion Gap 8 (6-14)    


 


Blood Urea Nitrogen


 20 mg/dL


(7-20) 


 


 





 


Creatinine


 0.9 mg/dL


(0.6-1.0) 


 


 





 


Estimated GFR


(Cockcroft-Gault) 65.8 


 


 


 





 


BUN/Creatinine Ratio 22 (6-20)    


 


Glucose Level


 94 mg/dL


(70-99) 


 


 





 


Lactic Acid Level


 1.4 mmol/L


(0.4-2.0) 


 


 





 


Calcium Level


 8.9 mg/dL


(8.5-10.1) 


 


 





 


Magnesium Level


 2.1 mg/dL


(1.8-2.4) 


 


 





 


Total Bilirubin


 0.5 mg/dL


(0.2-1.0) 


 


 





 


Aspartate Amino Transf


(AST/SGOT) 17 U/L (15-37) 


 


 


 





 


Alanine Aminotransferase


(ALT/SGPT) 23 U/L (14-59) 


 


 


 





 


Alkaline Phosphatase


 94 U/L


() 


 


 





 


Total Protein


 7.5 g/dL


(6.4-8.2) 


 


 





 


Albumin


 3.8 g/dL


(3.4-5.0) 


 


 





 


Albumin/Globulin Ratio 1.0 (1.0-1.7)    


 


Lipase


 91 U/L


() 


 


 





 


Procalcitonin


 < 0.10 ng/mL


(0.00-0.10) 


 


 





 


Urine Collection Type  Unknown   


 


Urine Color  Yellow   


 


Urine Clarity  Clear   


 


Urine pH  5.5 (<5.0-8.0)   


 


Urine Specific Gravity


 


 1.025


(1.000-1.030) 


 





 


Urine Protein


 


 Negative mg/dL


(NEG-TRACE) 


 





 


Urine Glucose (UA)


 


 Negative mg/dL


(NEG) 


 





 


Urine Ketones (Stick)


 


 Trace mg/dL


(NEG) 


 





 


Urine Blood  Trace (NEG)   


 


Urine Nitrite  Negative (NEG)   


 


Urine Bilirubin  Negative (NEG)   


 


Urine Urobilinogen Dipstick


 


 0.2 mg/dL (0.2


mg/dL) 


 





 


Urine Leukocyte Esterase  Negative (NEG)   


 


Urine RBC  Occ /HPF (0-2)   


 


Urine WBC  Occ /HPF (0-4)   


 


Urine Squamous Epithelial


Cells 


 Few /LPF 


 


 





 


Urine Bacteria  0 /HPF (0-FEW)   


 


Urine Mucus  Mod /LPF   


 


Urine Opiates Screen  Neg (NEG)   


 


Urine Methadone Screen  Neg (NEG)   


 


Urine Barbiturates  Neg (NEG)   


 


Urine Phencyclidine Screen  Neg (NEG)   


 


Urine


Amphetamine/Methamphetamine 


 Neg (NEG) 


 


 





 


Urine Benzodiazepines Screen  Pos (NEG)   


 


Urine Cocaine Screen  Neg (NEG)   


 


Urine Cannabinoids Screen  Neg (NEG)   


 


Urine Ethyl Alcohol  Neg (NEG)   


 


Bedside Urine HCG, Qualitative


 


 


 Hcg negative


(Negative) 





 


SARS-CoV-2 RNA (ALEKSANDRA)


 


 


 


 Negative


(Negative)


 


SARS-CoV-2 Antigen (Rapid)


 


 


 


 Negative


(NEGATIVE)


 


Test


 12/1/21


04:25 


 


 





 


White Blood Count


 4.5 x10^3/uL


(4.0-11.0) 


 


 





 


Red Blood Count


 4.27 x10^6/uL


(3.50-5.40) 


 


 





 


Hemoglobin


 12.0 g/dL


(12.0-15.5) 


 


 





 


Hematocrit


 37.1 %


(36.0-47.0) 


 


 





 


Mean Corpuscular Volume 87 fL ()    


 


Mean Corpuscular Hemoglobin 28 pg (25-35)    


 


Mean Corpuscular Hemoglobin


Concent 32 g/dL


(31-37) 


 


 





 


Red Cell Distribution Width


 14.8 %


(11.5-14.5) 


 


 





 


Platelet Count


 236 x10^3/uL


(140-400) 


 


 





 


Neutrophils (%) (Auto) 39 % (31-73)    


 


Lymphocytes (%) (Auto) 48 % (24-48)    


 


Monocytes (%) (Auto) 8 % (0-9)    


 


Eosinophils (%) (Auto) 4 % (0-3)    


 


Basophils (%) (Auto) 1 % (0-3)    


 


Neutrophils # (Auto)


 1.8 x10^3/uL


(1.8-7.7) 


 


 





 


Lymphocytes # (Auto)


 2.2 x10^3/uL


(1.0-4.8) 


 


 





 


Monocytes # (Auto)


 0.4 x10^3/uL


(0.0-1.1) 


 


 





 


Eosinophils # (Auto)


 0.2 x10^3/uL


(0.0-0.7) 


 


 





 


Basophils # (Auto)


 0.0 x10^3/uL


(0.0-0.2) 


 


 





 


Sodium Level


 140 mmol/L


(136-145) 


 


 





 


Potassium Level


 3.6 mmol/L


(3.5-5.1) 


 


 





 


Chloride Level


 106 mmol/L


() 


 


 





 


Carbon Dioxide Level


 25 mmol/L


(21-32) 


 


 





 


Anion Gap 9 (6-14)    


 


Blood Urea Nitrogen


 19 mg/dL


(7-20) 


 


 





 


Creatinine


 0.7 mg/dL


(0.6-1.0) 


 


 





 


Estimated GFR


(Cockcroft-Gault) 87.9 


 


 


 





 


BUN/Creatinine Ratio 27 (6-20)    


 


Glucose Level


 77 mg/dL


(70-99) 


 


 





 


Calcium Level


 8.1 mg/dL


(8.5-10.1) 


 


 





 


Total Bilirubin


 0.6 mg/dL


(0.2-1.0) 


 


 





 


Aspartate Amino Transf


(AST/SGOT) 21 U/L (15-37) 


 


 


 





 


Alanine Aminotransferase


(ALT/SGPT) 19 U/L (14-59) 


 


 


 





 


Alkaline Phosphatase


 79 U/L


() 


 


 





 


Total Protein


 6.0 g/dL


(6.4-8.2) 


 


 





 


Albumin


 3.1 g/dL


(3.4-5.0) 


 


 





 


Albumin/Globulin Ratio 1.1 (1.0-1.7)    








Laboratory Tests








Test


 11/30/21


13:58 11/30/21


14:04 11/30/21


14:07 11/30/21


18:10


 


White Blood Count


 5.6 x10^3/uL


(4.0-11.0) 


 


 





 


Red Blood Count


 4.87 x10^6/uL


(3.50-5.40) 


 


 





 


Hemoglobin


 13.9 g/dL


(12.0-15.5) 


 


 





 


Hematocrit


 41.7 %


(36.0-47.0) 


 


 





 


Mean Corpuscular Volume 86 fL ()    


 


Mean Corpuscular Hemoglobin 29 pg (25-35)    


 


Mean Corpuscular Hemoglobin


Concent 33 g/dL


(31-37) 


 


 





 


Red Cell Distribution Width


 14.5 %


(11.5-14.5) 


 


 





 


Platelet Count


 255 x10^3/uL


(140-400) 


 


 





 


Neutrophils (%) (Auto) 63 % (31-73)    


 


Lymphocytes (%) (Auto) 28 % (24-48)    


 


Monocytes (%) (Auto) 7 % (0-9)    


 


Eosinophils (%) (Auto) 2 % (0-3)    


 


Basophils (%) (Auto) 1 % (0-3)    


 


Neutrophils # (Auto)


 3.5 x10^3/uL


(1.8-7.7) 


 


 





 


Lymphocytes # (Auto)


 1.6 x10^3/uL


(1.0-4.8) 


 


 





 


Monocytes # (Auto)


 0.4 x10^3/uL


(0.0-1.1) 


 


 





 


Eosinophils # (Auto)


 0.1 x10^3/uL


(0.0-0.7) 


 


 





 


Basophils # (Auto)


 0.0 x10^3/uL


(0.0-0.2) 


 


 





 


Sodium Level


 139 mmol/L


(136-145) 


 


 





 


Potassium Level


 3.7 mmol/L


(3.5-5.1) 


 


 





 


Chloride Level


 102 mmol/L


() 


 


 





 


Carbon Dioxide Level


 29 mmol/L


(21-32) 


 


 





 


Anion Gap 8 (6-14)    


 


Blood Urea Nitrogen


 20 mg/dL


(7-20) 


 


 





 


Creatinine


 0.9 mg/dL


(0.6-1.0) 


 


 





 


Estimated GFR


(Cockcroft-Gault) 65.8 


 


 


 





 


BUN/Creatinine Ratio 22 (6-20)    


 


Glucose Level


 94 mg/dL


(70-99) 


 


 





 


Lactic Acid Level


 1.4 mmol/L


(0.4-2.0) 


 


 





 


Calcium Level


 8.9 mg/dL


(8.5-10.1) 


 


 





 


Magnesium Level


 2.1 mg/dL


(1.8-2.4) 


 


 





 


Total Bilirubin


 0.5 mg/dL


(0.2-1.0) 


 


 





 


Aspartate Amino Transf


(AST/SGOT) 17 U/L (15-37) 


 


 


 





 


Alanine Aminotransferase


(ALT/SGPT) 23 U/L (14-59) 


 


 


 





 


Alkaline Phosphatase


 94 U/L


() 


 


 





 


Total Protein


 7.5 g/dL


(6.4-8.2) 


 


 





 


Albumin


 3.8 g/dL


(3.4-5.0) 


 


 





 


Albumin/Globulin Ratio 1.0 (1.0-1.7)    


 


Lipase


 91 U/L


() 


 


 





 


Procalcitonin


 < 0.10 ng/mL


(0.00-0.10) 


 


 





 


Urine Collection Type  Unknown   


 


Urine Color  Yellow   


 


Urine Clarity  Clear   


 


Urine pH  5.5 (<5.0-8.0)   


 


Urine Specific Gravity


 


 1.025


(1.000-1.030) 


 





 


Urine Protein


 


 Negative mg/dL


(NEG-TRACE) 


 





 


Urine Glucose (UA)


 


 Negative mg/dL


(NEG) 


 





 


Urine Ketones (Stick)


 


 Trace mg/dL


(NEG) 


 





 


Urine Blood  Trace (NEG)   


 


Urine Nitrite  Negative (NEG)   


 


Urine Bilirubin  Negative (NEG)   


 


Urine Urobilinogen Dipstick


 


 0.2 mg/dL (0.2


mg/dL) 


 





 


Urine Leukocyte Esterase  Negative (NEG)   


 


Urine RBC  Occ /HPF (0-2)   


 


Urine WBC  Occ /HPF (0-4)   


 


Urine Squamous Epithelial


Cells 


 Few /LPF 


 


 





 


Urine Bacteria  0 /HPF (0-FEW)   


 


Urine Mucus  Mod /LPF   


 


Urine Opiates Screen  Neg (NEG)   


 


Urine Methadone Screen  Neg (NEG)   


 


Urine Barbiturates  Neg (NEG)   


 


Urine Phencyclidine Screen  Neg (NEG)   


 


Urine


Amphetamine/Methamphetamine 


 Neg (NEG) 


 


 





 


Urine Benzodiazepines Screen  Pos (NEG)   


 


Urine Cocaine Screen  Neg (NEG)   


 


Urine Cannabinoids Screen  Neg (NEG)   


 


Urine Ethyl Alcohol  Neg (NEG)   


 


Bedside Urine HCG, Qualitative


 


 


 Hcg negative


(Negative) 





 


SARS-CoV-2 RNA (ALEKSANDRA)


 


 


 


 Negative


(Negative)


 


SARS-CoV-2 Antigen (Rapid)


 


 


 


 Negative


(NEGATIVE)


 


Test


 12/1/21


04:25 


 


 





 


White Blood Count


 4.5 x10^3/uL


(4.0-11.0) 


 


 





 


Red Blood Count


 4.27 x10^6/uL


(3.50-5.40) 


 


 





 


Hemoglobin


 12.0 g/dL


(12.0-15.5) 


 


 





 


Hematocrit


 37.1 %


(36.0-47.0) 


 


 





 


Mean Corpuscular Volume 87 fL ()    


 


Mean Corpuscular Hemoglobin 28 pg (25-35)    


 


Mean Corpuscular Hemoglobin


Concent 32 g/dL


(31-37) 


 


 





 


Red Cell Distribution Width


 14.8 %


(11.5-14.5) 


 


 





 


Platelet Count


 236 x10^3/uL


(140-400) 


 


 





 


Neutrophils (%) (Auto) 39 % (31-73)    


 


Lymphocytes (%) (Auto) 48 % (24-48)    


 


Monocytes (%) (Auto) 8 % (0-9)    


 


Eosinophils (%) (Auto) 4 % (0-3)    


 


Basophils (%) (Auto) 1 % (0-3)    


 


Neutrophils # (Auto)


 1.8 x10^3/uL


(1.8-7.7) 


 


 





 


Lymphocytes # (Auto)


 2.2 x10^3/uL


(1.0-4.8) 


 


 





 


Monocytes # (Auto)


 0.4 x10^3/uL


(0.0-1.1) 


 


 





 


Eosinophils # (Auto)


 0.2 x10^3/uL


(0.0-0.7) 


 


 





 


Basophils # (Auto)


 0.0 x10^3/uL


(0.0-0.2) 


 


 





 


Sodium Level


 140 mmol/L


(136-145) 


 


 





 


Potassium Level


 3.6 mmol/L


(3.5-5.1) 


 


 





 


Chloride Level


 106 mmol/L


() 


 


 





 


Carbon Dioxide Level


 25 mmol/L


(21-32) 


 


 





 


Anion Gap 9 (6-14)    


 


Blood Urea Nitrogen


 19 mg/dL


(7-20) 


 


 





 


Creatinine


 0.7 mg/dL


(0.6-1.0) 


 


 





 


Estimated GFR


(Cockcroft-Gault) 87.9 


 


 


 





 


BUN/Creatinine Ratio 27 (6-20)    


 


Glucose Level


 77 mg/dL


(70-99) 


 


 





 


Calcium Level


 8.1 mg/dL


(8.5-10.1) 


 


 





 


Total Bilirubin


 0.6 mg/dL


(0.2-1.0) 


 


 





 


Aspartate Amino Transf


(AST/SGOT) 21 U/L (15-37) 


 


 


 





 


Alanine Aminotransferase


(ALT/SGPT) 19 U/L (14-59) 


 


 


 





 


Alkaline Phosphatase


 79 U/L


() 


 


 





 


Total Protein


 6.0 g/dL


(6.4-8.2) 


 


 





 


Albumin


 3.1 g/dL


(3.4-5.0) 


 


 





 


Albumin/Globulin Ratio 1.1 (1.0-1.7)    











Assessment/Plan


Assessment/Plan


abd pain


no acute findings, however pt states can not discharge until has surgery--will 

have Dr Valverde review with pt





KIARA VALVERDE MD 12/1/21 1307:


CONSULT


Assessment/Plan


Assessment/Plan


Patient seen and examined by me she is resting comfortably in bed does describe 

abdominal pain mostly low in the pelvic area and to the right lower abdomen she 

does not really point to where her hernia is abdomen is soft nondistended.  CT 

scan reviewed shows hernia as previously viewed without obstructive symptoms or 

signs.  Does comment on the ascending colon as being thickened possible colitis.

 Agree with Ronald assessment plan will have GI see for their opinion on her 

potential colitis.  Hernia has not changed and is not urgent











PAMELA RICARDO             Dec 1, 2021 11:39


KIARA VALVERDE MD              Dec 1, 2021 13:07

## 2021-12-01 NOTE — PN
DATE: 12/01/2021

SUBJECTIVE:  The patient is resting slightly propped up in bed, in no apparent 

distress, awake, alert.  On questioning her, she continued to complain of pain 

in her right lower quadrant; however, she did have a bowel movement this 

morning.  She has some nausea, but no vomiting.  Her clinical exam is stable, 

has not really changed.  Her vital signs are stable.



OBJECTIVE:

VITAL SIGNS:  Her heart rate was 54, blood pressure was 87/46, temperature 97.8,

respiratory rate was 18 and oxygen saturation was 97%.



LABORATORY WORK:  This morning again showed that her white cell count was only 

4500 with normal hemoglobin, hematocrit and platelets.  Her chemistry is 

essentially unremarkable.



ASSESSMENT AND PLAN:  Acute on chronic abdominal pain.  She has small anterior 

abdominal wall hernia, which is similar to prior.  She has also some prominence 

of the colon wall with small amount of haziness of the fat adjacent to the 

portion of the right side of the colon.  The patient is afebrile, 

hemodynamically stable.  White cell count was normal.  Again, we are waiting for

the evaluation by the surgical team regarding either colonoscopy or surgical 

treatment, although I doubt very much that either of these tests can be done 

today.







AMM/RAH

DR: AMM/nts   DD: 12/01/2021 11:25

DT: 12/01/2021 11:46   TID: [id number]

## 2021-12-02 VITALS — DIASTOLIC BLOOD PRESSURE: 66 MMHG | SYSTOLIC BLOOD PRESSURE: 109 MMHG

## 2021-12-02 VITALS — SYSTOLIC BLOOD PRESSURE: 102 MMHG | DIASTOLIC BLOOD PRESSURE: 62 MMHG

## 2021-12-02 RX ADMIN — PANTOPRAZOLE SODIUM SCH MG: 40 TABLET, DELAYED RELEASE ORAL at 08:47

## 2021-12-02 RX ADMIN — HYDROMORPHONE HYDROCHLORIDE PRN MG: 2 INJECTION INTRAMUSCULAR; INTRAVENOUS; SUBCUTANEOUS at 08:48

## 2021-12-02 RX ADMIN — VITAMIN D, TAB 1000IU (100/BT) SCH UNIT: 25 TAB at 08:47

## 2021-12-02 RX ADMIN — KETOROLAC TROMETHAMINE PRN MG: 30 INJECTION, SOLUTION INTRAMUSCULAR at 04:00

## 2021-12-02 RX ADMIN — COLESTIPOL HYDROCHLORIDE SCH GM: 1 TABLET ORAL at 08:47

## 2021-12-02 RX ADMIN — METOCLOPRAMIDE PRN MG: 5 INJECTION, SOLUTION INTRAMUSCULAR; INTRAVENOUS at 08:52

## 2021-12-02 NOTE — NUR
Discharge Note:



SARA FREED



Discharge instructions and discharge home medications reviewed with Patient and a copy 
given. All questions have been answered and understanding verbalized. 



The following instructions and handouts were given: follow up instructions, medication 
education



Discontinued lines and drains: 20 guage left AC, tip intact.  patient tolerated well.



Patient discharged to home with self care via .

## 2021-12-02 NOTE — PDOC
Date of Service:


DATE: 12/2/21 


TIME: 10:19





Subjective:


Subjective:


Feels better w/ Colestid, was told she could go home today, slept well last 

night.





Objective:


Vital Signs:





                                   Vital Signs








  Date Time  Temp Pulse Resp B/P (MAP) Pulse Ox O2 Delivery O2 Flow Rate FiO2


 


12/2/21 08:48     93 Room Air  


 


12/2/21 07:00 98.3 71 18 102/62 (75)    





 98.3       











PE:





GEN: NAD


LUNGS: CTAB


HEART: RRR


ABD: soft, bit less tender today


NEURO/PSYCH: A & O 3





A/P:


Chronic abd pain and diarrhea


Question of colitis on CT


S/p cholecystectomy, gastric sleeve, LAR w/ ileostomy/reversal, SBR, hernia 

repair


Elevated TSH - per primary





--


Diarrhea/pain improved w/ Colestid?  Tolerating diet.


She'd like to go home.  DC per primary - needs Colestid Rx (and also I believe 

refill of Bentyl).  


Stool tests ordered for completeness sake yesterday - collected/pending - 

follow-up for results.





Justicifation of Admission Dx:


Justifications for Admission:


Justification of Admission Dx:  Yes











SHELLEY SOTELO          Dec 2, 2021 10:23

## 2021-12-16 ENCOUNTER — HOSPITAL ENCOUNTER (OUTPATIENT)
Dept: HOSPITAL 61 - ENDOS | Age: 51
Discharge: HOME | End: 2021-12-16
Attending: SURGERY
Payer: OTHER GOVERNMENT

## 2021-12-16 VITALS — SYSTOLIC BLOOD PRESSURE: 108 MMHG | DIASTOLIC BLOOD PRESSURE: 72 MMHG

## 2021-12-16 VITALS — SYSTOLIC BLOOD PRESSURE: 158 MMHG | DIASTOLIC BLOOD PRESSURE: 87 MMHG

## 2021-12-16 VITALS — BODY MASS INDEX: 29.38 KG/M2 | WEIGHT: 176.37 LBS | HEIGHT: 65 IN

## 2021-12-16 DIAGNOSIS — Z98.890: ICD-10-CM

## 2021-12-16 DIAGNOSIS — E66.9: ICD-10-CM

## 2021-12-16 DIAGNOSIS — Z90.710: ICD-10-CM

## 2021-12-16 DIAGNOSIS — Z87.891: ICD-10-CM

## 2021-12-16 DIAGNOSIS — F32.9: ICD-10-CM

## 2021-12-16 DIAGNOSIS — K63.89: ICD-10-CM

## 2021-12-16 DIAGNOSIS — Z79.899: ICD-10-CM

## 2021-12-16 DIAGNOSIS — Z88.1: ICD-10-CM

## 2021-12-16 DIAGNOSIS — K52.9: Primary | ICD-10-CM

## 2021-12-16 PROCEDURE — 45378 DIAGNOSTIC COLONOSCOPY: CPT
